# Patient Record
Sex: FEMALE | Race: BLACK OR AFRICAN AMERICAN | NOT HISPANIC OR LATINO | Employment: OTHER | URBAN - METROPOLITAN AREA
[De-identification: names, ages, dates, MRNs, and addresses within clinical notes are randomized per-mention and may not be internally consistent; named-entity substitution may affect disease eponyms.]

---

## 2023-07-11 LAB — TROPONIN I, HIGH SENSITIVITY: 7 NG/L (ref 0–13)

## 2024-01-14 ENCOUNTER — APPOINTMENT (OUTPATIENT)
Dept: RADIOLOGY | Facility: HOSPITAL | Age: 64
End: 2024-01-14
Payer: MEDICAID

## 2024-01-14 ENCOUNTER — HOSPITAL ENCOUNTER (INPATIENT)
Facility: HOSPITAL | Age: 64
LOS: 9 days | Discharge: NURSING, REHAB OR LONG-TERM CARE | End: 2024-01-24
Attending: STUDENT IN AN ORGANIZED HEALTH CARE EDUCATION/TRAINING PROGRAM | Admitting: STUDENT IN AN ORGANIZED HEALTH CARE EDUCATION/TRAINING PROGRAM
Payer: MEDICAID

## 2024-01-14 DIAGNOSIS — J11.1 INFLUENZA: ICD-10-CM

## 2024-01-14 DIAGNOSIS — I21.4 NSTEMI (NON-ST ELEVATED MYOCARDIAL INFARCTION) (MULTI): ICD-10-CM

## 2024-01-14 DIAGNOSIS — R06.02 SHORTNESS OF BREATH: Primary | ICD-10-CM

## 2024-01-14 DIAGNOSIS — J96.02 ACUTE RESPIRATORY FAILURE WITH HYPERCAPNIA (MULTI): ICD-10-CM

## 2024-01-14 DIAGNOSIS — R07.9 CHEST PAIN, UNSPECIFIED: ICD-10-CM

## 2024-01-14 DIAGNOSIS — R07.89 OTHER CHEST PAIN: ICD-10-CM

## 2024-01-14 PROCEDURE — 71045 X-RAY EXAM CHEST 1 VIEW: CPT

## 2024-01-14 PROCEDURE — 2500000002 HC RX 250 W HCPCS SELF ADMINISTERED DRUGS (ALT 637 FOR MEDICARE OP, ALT 636 FOR OP/ED): Performed by: STUDENT IN AN ORGANIZED HEALTH CARE EDUCATION/TRAINING PROGRAM

## 2024-01-14 PROCEDURE — 99291 CRITICAL CARE FIRST HOUR: CPT | Performed by: STUDENT IN AN ORGANIZED HEALTH CARE EDUCATION/TRAINING PROGRAM

## 2024-01-14 PROCEDURE — 94640 AIRWAY INHALATION TREATMENT: CPT

## 2024-01-14 PROCEDURE — 5A09357 ASSISTANCE WITH RESPIRATORY VENTILATION, LESS THAN 24 CONSECUTIVE HOURS, CONTINUOUS POSITIVE AIRWAY PRESSURE: ICD-10-PCS | Performed by: STUDENT IN AN ORGANIZED HEALTH CARE EDUCATION/TRAINING PROGRAM

## 2024-01-14 PROCEDURE — 94660 CPAP INITIATION&MGMT: CPT

## 2024-01-14 RX ORDER — IPRATROPIUM BROMIDE AND ALBUTEROL SULFATE 2.5; .5 MG/3ML; MG/3ML
SOLUTION RESPIRATORY (INHALATION)
Status: DISPENSED
Start: 2024-01-14 | End: 2024-01-15

## 2024-01-14 RX ORDER — IPRATROPIUM BROMIDE AND ALBUTEROL SULFATE 2.5; .5 MG/3ML; MG/3ML
6 SOLUTION RESPIRATORY (INHALATION) ONCE
Status: COMPLETED | OUTPATIENT
Start: 2024-01-14 | End: 2024-01-14

## 2024-01-14 RX ADMIN — IPRATROPIUM BROMIDE AND ALBUTEROL SULFATE 6 ML: .5; 3 SOLUTION RESPIRATORY (INHALATION) at 23:45

## 2024-01-14 ASSESSMENT — COLUMBIA-SUICIDE SEVERITY RATING SCALE - C-SSRS
1. IN THE PAST MONTH, HAVE YOU WISHED YOU WERE DEAD OR WISHED YOU COULD GO TO SLEEP AND NOT WAKE UP?: NO
6. HAVE YOU EVER DONE ANYTHING, STARTED TO DO ANYTHING, OR PREPARED TO DO ANYTHING TO END YOUR LIFE?: NO
2. HAVE YOU ACTUALLY HAD ANY THOUGHTS OF KILLING YOURSELF?: NO

## 2024-01-15 ENCOUNTER — APPOINTMENT (OUTPATIENT)
Dept: CARDIOLOGY | Facility: HOSPITAL | Age: 64
End: 2024-01-15
Payer: MEDICAID

## 2024-01-15 PROBLEM — R06.02 SHORTNESS OF BREATH: Status: ACTIVE | Noted: 2024-01-15

## 2024-01-15 LAB
ALBUMIN SERPL BCP-MCNC: 3.8 G/DL (ref 3.4–5)
ALP SERPL-CCNC: 59 U/L (ref 33–136)
ALT SERPL W P-5'-P-CCNC: 14 U/L (ref 7–45)
ANION GAP BLDV CALCULATED.4IONS-SCNC: 9 MMOL/L (ref 10–25)
ANION GAP SERPL CALC-SCNC: 14 MMOL/L (ref 10–20)
APTT PPP: 34 SECONDS (ref 27–38)
AST SERPL W P-5'-P-CCNC: 17 U/L (ref 9–39)
ATRIAL RATE: 118 BPM
BASE EXCESS BLDV CALC-SCNC: 6.6 MMOL/L (ref -2–3)
BASE EXCESS BLDV CALC-SCNC: 6.9 MMOL/L (ref -2–3)
BASOPHILS # BLD AUTO: 0.01 X10*3/UL (ref 0–0.1)
BASOPHILS NFR BLD AUTO: 0.1 %
BILIRUB SERPL-MCNC: 0.4 MG/DL (ref 0–1.2)
BNP SERPL-MCNC: 85 PG/ML (ref 0–99)
BODY TEMPERATURE: 37 DEGREES CELSIUS
BODY TEMPERATURE: 37 DEGREES CELSIUS
BUN SERPL-MCNC: 8 MG/DL (ref 6–23)
CA-I BLDV-SCNC: 1.15 MMOL/L (ref 1.1–1.33)
CALCIUM SERPL-MCNC: 8.9 MG/DL (ref 8.6–10.3)
CARDIAC TROPONIN I PNL SERPL HS: 189 NG/L (ref 0–13)
CARDIAC TROPONIN I PNL SERPL HS: 49 NG/L (ref 0–13)
CARDIAC TROPONIN I PNL SERPL HS: 648 NG/L (ref 0–13)
CHLORIDE BLDV-SCNC: 98 MMOL/L (ref 98–107)
CHLORIDE SERPL-SCNC: 96 MMOL/L (ref 98–107)
CO2 SERPL-SCNC: 32 MMOL/L (ref 21–32)
CREAT SERPL-MCNC: 0.83 MG/DL (ref 0.5–1.05)
CRITICAL CALL TIME: 9
CRITICAL CALLED BY: ABNORMAL
CRITICAL CALLED TO: ABNORMAL
CRITICAL READ BACK: ABNORMAL
EGFRCR SERPLBLD CKD-EPI 2021: 79 ML/MIN/1.73M*2
EOSINOPHIL # BLD AUTO: 0.02 X10*3/UL (ref 0–0.7)
EOSINOPHIL NFR BLD AUTO: 0.2 %
ERYTHROCYTE [DISTWIDTH] IN BLOOD BY AUTOMATED COUNT: 15.7 % (ref 11.5–14.5)
FLOW: 6 LPM
FLUAV RNA RESP QL NAA+PROBE: DETECTED
FLUBV RNA RESP QL NAA+PROBE: NOT DETECTED
GLUCOSE BLD MANUAL STRIP-MCNC: 232 MG/DL (ref 74–99)
GLUCOSE BLD MANUAL STRIP-MCNC: 234 MG/DL (ref 74–99)
GLUCOSE BLD MANUAL STRIP-MCNC: 234 MG/DL (ref 74–99)
GLUCOSE BLDV-MCNC: 233 MG/DL (ref 74–99)
GLUCOSE SERPL-MCNC: 205 MG/DL (ref 74–99)
HCO3 BLDV-SCNC: 34.8 MMOL/L (ref 22–26)
HCO3 BLDV-SCNC: 35.6 MMOL/L (ref 22–26)
HCT VFR BLD AUTO: 34.9 % (ref 36–46)
HCT VFR BLD EST: 34 % (ref 36–46)
HGB BLD-MCNC: 10.7 G/DL (ref 12–16)
HGB BLDV-MCNC: 11.3 G/DL (ref 12–16)
HOLD SPECIMEN: NORMAL
HOLD SPECIMEN: NORMAL
IMM GRANULOCYTES # BLD AUTO: 0.03 X10*3/UL (ref 0–0.7)
IMM GRANULOCYTES NFR BLD AUTO: 0.4 % (ref 0–0.9)
INHALED O2 CONCENTRATION: 40 %
INHALED O2 CONCENTRATION: 40 %
INR PPP: 1.1 (ref 0.9–1.1)
LACTATE BLDV-SCNC: 1.6 MMOL/L (ref 0.4–2)
LYMPHOCYTES # BLD AUTO: 0.46 X10*3/UL (ref 1.2–4.8)
LYMPHOCYTES NFR BLD AUTO: 5.6 %
MAGNESIUM SERPL-MCNC: 1.95 MG/DL (ref 1.6–2.4)
MCH RBC QN AUTO: 26.5 PG (ref 26–34)
MCHC RBC AUTO-ENTMCNC: 30.7 G/DL (ref 32–36)
MCV RBC AUTO: 86 FL (ref 80–100)
MONOCYTES # BLD AUTO: 0.43 X10*3/UL (ref 0.1–1)
MONOCYTES NFR BLD AUTO: 5.2 %
NEUTROPHILS # BLD AUTO: 7.33 X10*3/UL (ref 1.2–7.7)
NEUTROPHILS NFR BLD AUTO: 88.5 %
NRBC BLD-RTO: 0 /100 WBCS (ref 0–0)
OXYHGB MFR BLDV: 26.7 % (ref 45–75)
OXYHGB MFR BLDV: 57.7 % (ref 45–75)
P AXIS: 55 DEGREES
P OFFSET: 188 MS
P ONSET: 129 MS
PCO2 BLDV: 66 MM HG (ref 41–51)
PCO2 BLDV: 74 MM HG (ref 41–51)
PH BLDV: 7.29 PH (ref 7.33–7.43)
PH BLDV: 7.33 PH (ref 7.33–7.43)
PLATELET # BLD AUTO: 192 X10*3/UL (ref 150–450)
PO2 BLDV: 25 MM HG (ref 35–45)
PO2 BLDV: 37 MM HG (ref 35–45)
POTASSIUM BLDV-SCNC: 4.3 MMOL/L (ref 3.5–5.3)
POTASSIUM SERPL-SCNC: 4.1 MMOL/L (ref 3.5–5.3)
PR INTERVAL: 162 MS
PROT SERPL-MCNC: 8.3 G/DL (ref 6.4–8.2)
PROTHROMBIN TIME: 12.7 SECONDS (ref 9.8–12.8)
Q ONSET: 210 MS
QRS COUNT: 20 BEATS
QRS DURATION: 92 MS
QT INTERVAL: 324 MS
QTC CALCULATION(BAZETT): 454 MS
QTC FREDERICIA: 406 MS
R AXIS: -30 DEGREES
RBC # BLD AUTO: 4.04 X10*6/UL (ref 4–5.2)
RSV RNA RESP QL NAA+PROBE: NOT DETECTED
SAO2 % BLDV: 27 % (ref 45–75)
SAO2 % BLDV: 59 % (ref 45–75)
SARS-COV-2 RNA RESP QL NAA+PROBE: NOT DETECTED
SODIUM BLDV-SCNC: 138 MMOL/L (ref 136–145)
SODIUM SERPL-SCNC: 138 MMOL/L (ref 136–145)
T AXIS: 71 DEGREES
T OFFSET: 372 MS
TEST COMMENT: ABNORMAL
UFH PPP CHRO-ACNC: 0.3 IU/ML
UFH PPP CHRO-ACNC: 0.4 IU/ML
UFH PPP CHRO-ACNC: <0.1 IU/ML
VENTRICULAR RATE: 118 BPM
WBC # BLD AUTO: 8.3 X10*3/UL (ref 4.4–11.3)

## 2024-01-15 PROCEDURE — 71045 X-RAY EXAM CHEST 1 VIEW: CPT | Performed by: STUDENT IN AN ORGANIZED HEALTH CARE EDUCATION/TRAINING PROGRAM

## 2024-01-15 PROCEDURE — 83880 ASSAY OF NATRIURETIC PEPTIDE: CPT | Performed by: STUDENT IN AN ORGANIZED HEALTH CARE EDUCATION/TRAINING PROGRAM

## 2024-01-15 PROCEDURE — 2500000001 HC RX 250 WO HCPCS SELF ADMINISTERED DRUGS (ALT 637 FOR MEDICARE OP)

## 2024-01-15 PROCEDURE — 96365 THER/PROPH/DIAG IV INF INIT: CPT

## 2024-01-15 PROCEDURE — 36415 COLL VENOUS BLD VENIPUNCTURE: CPT | Performed by: STUDENT IN AN ORGANIZED HEALTH CARE EDUCATION/TRAINING PROGRAM

## 2024-01-15 PROCEDURE — 80053 COMPREHEN METABOLIC PANEL: CPT | Performed by: STUDENT IN AN ORGANIZED HEALTH CARE EDUCATION/TRAINING PROGRAM

## 2024-01-15 PROCEDURE — 2500000004 HC RX 250 GENERAL PHARMACY W/ HCPCS (ALT 636 FOR OP/ED)

## 2024-01-15 PROCEDURE — 2500000002 HC RX 250 W HCPCS SELF ADMINISTERED DRUGS (ALT 637 FOR MEDICARE OP, ALT 636 FOR OP/ED)

## 2024-01-15 PROCEDURE — 2500000004 HC RX 250 GENERAL PHARMACY W/ HCPCS (ALT 636 FOR OP/ED): Performed by: STUDENT IN AN ORGANIZED HEALTH CARE EDUCATION/TRAINING PROGRAM

## 2024-01-15 PROCEDURE — 83735 ASSAY OF MAGNESIUM: CPT

## 2024-01-15 PROCEDURE — 84484 ASSAY OF TROPONIN QUANT: CPT | Performed by: STUDENT IN AN ORGANIZED HEALTH CARE EDUCATION/TRAINING PROGRAM

## 2024-01-15 PROCEDURE — 85025 COMPLETE CBC W/AUTO DIFF WBC: CPT | Performed by: STUDENT IN AN ORGANIZED HEALTH CARE EDUCATION/TRAINING PROGRAM

## 2024-01-15 PROCEDURE — 94660 CPAP INITIATION&MGMT: CPT

## 2024-01-15 PROCEDURE — 85520 HEPARIN ASSAY: CPT | Performed by: STUDENT IN AN ORGANIZED HEALTH CARE EDUCATION/TRAINING PROGRAM

## 2024-01-15 PROCEDURE — 84132 ASSAY OF SERUM POTASSIUM: CPT | Performed by: STUDENT IN AN ORGANIZED HEALTH CARE EDUCATION/TRAINING PROGRAM

## 2024-01-15 PROCEDURE — 99223 1ST HOSP IP/OBS HIGH 75: CPT | Performed by: STUDENT IN AN ORGANIZED HEALTH CARE EDUCATION/TRAINING PROGRAM

## 2024-01-15 PROCEDURE — 82947 ASSAY GLUCOSE BLOOD QUANT: CPT

## 2024-01-15 PROCEDURE — 99222 1ST HOSP IP/OBS MODERATE 55: CPT

## 2024-01-15 PROCEDURE — 82805 BLOOD GASES W/O2 SATURATION: CPT | Performed by: STUDENT IN AN ORGANIZED HEALTH CARE EDUCATION/TRAINING PROGRAM

## 2024-01-15 PROCEDURE — 87637 SARSCOV2&INF A&B&RSV AMP PRB: CPT | Performed by: STUDENT IN AN ORGANIZED HEALTH CARE EDUCATION/TRAINING PROGRAM

## 2024-01-15 PROCEDURE — 96375 TX/PRO/DX INJ NEW DRUG ADDON: CPT

## 2024-01-15 PROCEDURE — 2060000001 HC INTERMEDIATE ICU ROOM DAILY

## 2024-01-15 PROCEDURE — 85610 PROTHROMBIN TIME: CPT

## 2024-01-15 PROCEDURE — 84484 ASSAY OF TROPONIN QUANT: CPT

## 2024-01-15 PROCEDURE — 93005 ELECTROCARDIOGRAM TRACING: CPT

## 2024-01-15 RX ORDER — DEXTROSE MONOHYDRATE 100 MG/ML
0.3 INJECTION, SOLUTION INTRAVENOUS ONCE AS NEEDED
Status: DISCONTINUED | OUTPATIENT
Start: 2024-01-15 | End: 2024-01-24 | Stop reason: HOSPADM

## 2024-01-15 RX ORDER — MONTELUKAST SODIUM 10 MG/1
10 TABLET ORAL NIGHTLY
COMMUNITY
Start: 2022-07-22

## 2024-01-15 RX ORDER — GUAIFENESIN 600 MG/1
600 TABLET, EXTENDED RELEASE ORAL 2 TIMES DAILY
Status: DISCONTINUED | OUTPATIENT
Start: 2024-01-15 | End: 2024-01-24 | Stop reason: HOSPADM

## 2024-01-15 RX ORDER — CYCLOBENZAPRINE HCL 10 MG
1 TABLET ORAL EVERY 12 HOURS
COMMUNITY
Start: 2022-07-22

## 2024-01-15 RX ORDER — ERGOCALCIFEROL 1.25 MG/1
50000 CAPSULE ORAL 2 TIMES WEEKLY
COMMUNITY
Start: 2023-03-16

## 2024-01-15 RX ORDER — DOCUSATE SODIUM 100 MG/1
100 CAPSULE, LIQUID FILLED ORAL 2 TIMES DAILY
COMMUNITY

## 2024-01-15 RX ORDER — SPIRONOLACTONE 25 MG/1
12.5 TABLET ORAL DAILY
COMMUNITY
Start: 2023-08-11

## 2024-01-15 RX ORDER — POLYETHYLENE GLYCOL 3350 17 G/17G
17 POWDER, FOR SOLUTION ORAL DAILY
Status: DISCONTINUED | OUTPATIENT
Start: 2024-01-15 | End: 2024-01-24 | Stop reason: HOSPADM

## 2024-01-15 RX ORDER — BENZONATATE 100 MG/1
100 CAPSULE ORAL 3 TIMES DAILY PRN
Status: DISCONTINUED | OUTPATIENT
Start: 2024-01-15 | End: 2024-01-24 | Stop reason: HOSPADM

## 2024-01-15 RX ORDER — ALBUTEROL SULFATE 0.83 MG/ML
2.5 SOLUTION RESPIRATORY (INHALATION) EVERY 6 HOURS PRN
COMMUNITY
Start: 2023-09-01

## 2024-01-15 RX ORDER — DEXTROSE 50 % IN WATER (D50W) INTRAVENOUS SYRINGE
25
Status: DISCONTINUED | OUTPATIENT
Start: 2024-01-15 | End: 2024-01-24 | Stop reason: HOSPADM

## 2024-01-15 RX ORDER — ATORVASTATIN CALCIUM 40 MG/1
40 TABLET, FILM COATED ORAL NIGHTLY
COMMUNITY
Start: 2021-10-25

## 2024-01-15 RX ORDER — BUDESONIDE 0.5 MG/2ML
0.5 INHALANT ORAL EVERY 12 HOURS
COMMUNITY
Start: 2023-08-28

## 2024-01-15 RX ORDER — INSULIN LISPRO 100 [IU]/ML
10 INJECTION, SOLUTION INTRAVENOUS; SUBCUTANEOUS
COMMUNITY
Start: 2022-10-20

## 2024-01-15 RX ORDER — ACETAMINOPHEN 325 MG/1
650 TABLET ORAL EVERY 4 HOURS PRN
Status: DISCONTINUED | OUTPATIENT
Start: 2024-01-15 | End: 2024-01-24 | Stop reason: HOSPADM

## 2024-01-15 RX ORDER — SENNOSIDES 8.6 MG/1
1 TABLET ORAL 2 TIMES DAILY
COMMUNITY
Start: 2012-04-11

## 2024-01-15 RX ORDER — PSEUDOEPH/DM/GUAIFEN/ACETAMIN 30-10-324
1 EXPECTORANT ORAL AS NEEDED
COMMUNITY
Start: 2024-01-05

## 2024-01-15 RX ORDER — INSULIN LISPRO 100 [IU]/ML
INJECTION, SOLUTION INTRAVENOUS; SUBCUTANEOUS
COMMUNITY
Start: 2023-11-13

## 2024-01-15 RX ORDER — LORAZEPAM 1 MG/1
1 TABLET ORAL EVERY 8 HOURS PRN
COMMUNITY

## 2024-01-15 RX ORDER — NITROGLYCERIN 0.4 MG/1
0.4 TABLET SUBLINGUAL EVERY 5 MIN PRN
COMMUNITY

## 2024-01-15 RX ORDER — HEPARIN SODIUM 10000 [USP'U]/100ML
0-4000 INJECTION, SOLUTION INTRAVENOUS CONTINUOUS
Status: DISCONTINUED | OUTPATIENT
Start: 2024-01-15 | End: 2024-01-17

## 2024-01-15 RX ORDER — ACETAMINOPHEN 500 MG
2 TABLET ORAL EVERY 8 HOURS PRN
COMMUNITY
Start: 2023-08-23

## 2024-01-15 RX ORDER — CHLORHEXIDINE GLUCONATE ORAL RINSE 1.2 MG/ML
15 SOLUTION DENTAL DAILY
COMMUNITY

## 2024-01-15 RX ORDER — FORMOTEROL FUMARATE DIHYDRATE 20 UG/2ML
20 SOLUTION RESPIRATORY (INHALATION) EVERY 12 HOURS
COMMUNITY
Start: 2023-08-28

## 2024-01-15 RX ORDER — CALCIUM CARBONATE 200(500)MG
1 TABLET,CHEWABLE ORAL EVERY 12 HOURS PRN
COMMUNITY
Start: 2022-10-20

## 2024-01-15 RX ORDER — PREGABALIN 200 MG/1
200 CAPSULE ORAL 2 TIMES DAILY
COMMUNITY

## 2024-01-15 RX ORDER — POLYETHYLENE GLYCOL 3350 17 G/17G
17 POWDER, FOR SOLUTION ORAL DAILY
COMMUNITY
Start: 2012-07-16

## 2024-01-15 RX ORDER — HEPARIN SODIUM 5000 [USP'U]/ML
3000-4000 INJECTION, SOLUTION INTRAVENOUS; SUBCUTANEOUS EVERY 4 HOURS PRN
Status: DISCONTINUED | OUTPATIENT
Start: 2024-01-15 | End: 2024-01-17

## 2024-01-15 RX ORDER — HYDROCORTISONE 1 %
1 CREAM (GRAM) TOPICAL EVERY 12 HOURS PRN
COMMUNITY
Start: 2023-12-04

## 2024-01-15 RX ORDER — BUTALBITAL, ASPIRIN, AND CAFFEINE 325; 50; 40 MG/1; MG/1; MG/1
1 CAPSULE ORAL EVERY 6 HOURS PRN
COMMUNITY
Start: 2023-04-01

## 2024-01-15 RX ORDER — NAPROXEN SODIUM 220 MG/1
1 TABLET, FILM COATED ORAL DAILY
COMMUNITY
Start: 2023-08-22

## 2024-01-15 RX ORDER — HEPARIN SODIUM 5000 [USP'U]/ML
4000 INJECTION, SOLUTION INTRAVENOUS; SUBCUTANEOUS ONCE
Status: COMPLETED | OUTPATIENT
Start: 2024-01-15 | End: 2024-01-15

## 2024-01-15 RX ORDER — FUROSEMIDE 40 MG/1
40 TABLET ORAL DAILY
COMMUNITY

## 2024-01-15 RX ORDER — MIRABEGRON 25 MG/1
1 TABLET, FILM COATED, EXTENDED RELEASE ORAL DAILY
COMMUNITY
Start: 2023-12-22

## 2024-01-15 RX ORDER — OMEPRAZOLE 20 MG/1
20 CAPSULE, DELAYED RELEASE ORAL
COMMUNITY
Start: 2023-02-16

## 2024-01-15 RX ORDER — OXYCODONE AND ACETAMINOPHEN 5; 325 MG/1; MG/1
2 TABLET ORAL EVERY 6 HOURS
COMMUNITY
Start: 2014-10-13 | End: 2024-01-24 | Stop reason: HOSPADM

## 2024-01-15 RX ORDER — NAPROXEN SODIUM 220 MG/1
324 TABLET, FILM COATED ORAL ONCE
Status: COMPLETED | OUTPATIENT
Start: 2024-01-15 | End: 2024-01-15

## 2024-01-15 RX ORDER — BISACODYL 10 MG/1
10 SUPPOSITORY RECTAL DAILY PRN
COMMUNITY
Start: 2023-08-10

## 2024-01-15 RX ORDER — AMMONIUM LACTATE 12 G/100G
1 LOTION TOPICAL AS NEEDED
COMMUNITY
Start: 2023-08-23

## 2024-01-15 RX ORDER — INSULIN GLARGINE 100 [IU]/ML
26 INJECTION, SOLUTION SUBCUTANEOUS 2 TIMES DAILY
Status: ON HOLD | COMMUNITY
Start: 2020-12-03 | End: 2024-01-19 | Stop reason: SDUPTHER

## 2024-01-15 RX ORDER — DULOXETIN HYDROCHLORIDE 60 MG/1
60 CAPSULE, DELAYED RELEASE ORAL DAILY
COMMUNITY
Start: 2022-05-03

## 2024-01-15 RX ORDER — CAPSAICIN 0.75 MG/G
1 CREAM TOPICAL 3 TIMES DAILY
COMMUNITY
Start: 2023-12-13

## 2024-01-15 RX ORDER — FAMOTIDINE 20 MG/1
20 TABLET, FILM COATED ORAL DAILY
COMMUNITY
Start: 2012-07-16

## 2024-01-15 RX ORDER — MAGNESIUM SULFATE HEPTAHYDRATE 40 MG/ML
2 INJECTION, SOLUTION INTRAVENOUS ONCE
Status: COMPLETED | OUTPATIENT
Start: 2024-01-15 | End: 2024-01-15

## 2024-01-15 RX ORDER — INSULIN LISPRO 100 [IU]/ML
0-10 INJECTION, SOLUTION INTRAVENOUS; SUBCUTANEOUS
Status: DISCONTINUED | OUTPATIENT
Start: 2024-01-15 | End: 2024-01-24 | Stop reason: HOSPADM

## 2024-01-15 RX ADMIN — INSULIN LISPRO 4 UNITS: 100 INJECTION, SOLUTION INTRAVENOUS; SUBCUTANEOUS at 13:29

## 2024-01-15 RX ADMIN — HEPARIN SODIUM 1000 UNITS/HR: 10000 INJECTION, SOLUTION INTRAVENOUS at 04:17

## 2024-01-15 RX ADMIN — METHYLPREDNISOLONE SODIUM SUCCINATE 40 MG: 40 INJECTION, POWDER, FOR SOLUTION INTRAMUSCULAR; INTRAVENOUS at 18:46

## 2024-01-15 RX ADMIN — MAGNESIUM SULFATE HEPTAHYDRATE 2 G: 40 INJECTION, SOLUTION INTRAVENOUS at 00:39

## 2024-01-15 RX ADMIN — INSULIN LISPRO 4 UNITS: 100 INJECTION, SOLUTION INTRAVENOUS; SUBCUTANEOUS at 09:11

## 2024-01-15 RX ADMIN — METHYLPREDNISOLONE SODIUM SUCCINATE 40 MG: 40 INJECTION, POWDER, FOR SOLUTION INTRAMUSCULAR; INTRAVENOUS at 08:56

## 2024-01-15 RX ADMIN — INSULIN LISPRO 4 UNITS: 100 INJECTION, SOLUTION INTRAVENOUS; SUBCUTANEOUS at 18:47

## 2024-01-15 RX ADMIN — METHYLPREDNISOLONE SODIUM SUCCINATE 125 MG: 125 INJECTION INTRAMUSCULAR; INTRAVENOUS at 00:38

## 2024-01-15 RX ADMIN — HEPARIN SODIUM 4000 UNITS: 5000 INJECTION INTRAVENOUS; SUBCUTANEOUS at 04:16

## 2024-01-15 RX ADMIN — ASPIRIN 324 MG: 81 TABLET, CHEWABLE ORAL at 04:16

## 2024-01-15 SDOH — SOCIAL STABILITY: SOCIAL INSECURITY: HAS ANYONE EVER THREATENED TO HURT YOUR FAMILY OR YOUR PETS?: NO

## 2024-01-15 SDOH — SOCIAL STABILITY: SOCIAL INSECURITY: DO YOU FEEL ANYONE HAS EXPLOITED OR TAKEN ADVANTAGE OF YOU FINANCIALLY OR OF YOUR PERSONAL PROPERTY?: NO

## 2024-01-15 SDOH — SOCIAL STABILITY: SOCIAL INSECURITY: DO YOU FEEL UNSAFE GOING BACK TO THE PLACE WHERE YOU ARE LIVING?: NO

## 2024-01-15 SDOH — SOCIAL STABILITY: SOCIAL INSECURITY: ARE YOU OR HAVE YOU BEEN THREATENED OR ABUSED PHYSICALLY, EMOTIONALLY, OR SEXUALLY BY ANYONE?: NO

## 2024-01-15 SDOH — SOCIAL STABILITY: SOCIAL INSECURITY: ARE THERE ANY APPARENT SIGNS OF INJURIES/BEHAVIORS THAT COULD BE RELATED TO ABUSE/NEGLECT?: NO

## 2024-01-15 SDOH — SOCIAL STABILITY: SOCIAL INSECURITY: WERE YOU ABLE TO COMPLETE ALL THE BEHAVIORAL HEALTH SCREENINGS?: YES

## 2024-01-15 SDOH — SOCIAL STABILITY: SOCIAL INSECURITY: ABUSE: ADULT

## 2024-01-15 SDOH — SOCIAL STABILITY: SOCIAL INSECURITY: DOES ANYONE TRY TO KEEP YOU FROM HAVING/CONTACTING OTHER FRIENDS OR DOING THINGS OUTSIDE YOUR HOME?: NO

## 2024-01-15 SDOH — SOCIAL STABILITY: SOCIAL INSECURITY: HAVE YOU HAD THOUGHTS OF HARMING ANYONE ELSE?: NO

## 2024-01-15 ASSESSMENT — LIFESTYLE VARIABLES
AUDIT-C TOTAL SCORE: 0
SKIP TO QUESTIONS 9-10: 1
SUBSTANCE_ABUSE_PAST_12_MONTHS: NO
HOW MANY STANDARD DRINKS CONTAINING ALCOHOL DO YOU HAVE ON A TYPICAL DAY: PATIENT DOES NOT DRINK
HOW OFTEN DO YOU HAVE A DRINK CONTAINING ALCOHOL: NEVER
HOW OFTEN DO YOU HAVE 6 OR MORE DRINKS ON ONE OCCASION: NEVER
AUDIT-C TOTAL SCORE: 0
PRESCIPTION_ABUSE_PAST_12_MONTHS: NO

## 2024-01-15 ASSESSMENT — COGNITIVE AND FUNCTIONAL STATUS - GENERAL
MOBILITY SCORE: 10
TURNING FROM BACK TO SIDE WHILE IN FLAT BAD: A LOT
TOILETING: A LITTLE
DRESSING REGULAR UPPER BODY CLOTHING: A LITTLE
PATIENT BASELINE BEDBOUND: NO
MOVING TO AND FROM BED TO CHAIR: A LOT
STANDING UP FROM CHAIR USING ARMS: A LOT
DRESSING REGULAR LOWER BODY CLOTHING: A LOT
CLIMB 3 TO 5 STEPS WITH RAILING: TOTAL
WALKING IN HOSPITAL ROOM: TOTAL
HELP NEEDED FOR BATHING: A LITTLE
DAILY ACTIVITIY SCORE: 19
MOVING FROM LYING ON BACK TO SITTING ON SIDE OF FLAT BED WITH BEDRAILS: A LOT

## 2024-01-15 ASSESSMENT — ACTIVITIES OF DAILY LIVING (ADL)
JUDGMENT_ADEQUATE_SAFELY_COMPLETE_DAILY_ACTIVITIES: YES
BATHING: NEEDS ASSISTANCE
PATIENT'S MEMORY ADEQUATE TO SAFELY COMPLETE DAILY ACTIVITIES?: YES
TOILETING: NEEDS ASSISTANCE
ADEQUATE_TO_COMPLETE_ADL: YES
ASSISTIVE_DEVICE: WHEELCHAIR
FEEDING YOURSELF: INDEPENDENT
GROOMING: INDEPENDENT
DRESSING YOURSELF: NEEDS ASSISTANCE
LACK_OF_TRANSPORTATION: NO
HEARING - RIGHT EAR: FUNCTIONAL
WALKS IN HOME: DEPENDENT
HEARING - LEFT EAR: FUNCTIONAL

## 2024-01-15 ASSESSMENT — PATIENT HEALTH QUESTIONNAIRE - PHQ9
2. FEELING DOWN, DEPRESSED OR HOPELESS: NOT AT ALL
SUM OF ALL RESPONSES TO PHQ9 QUESTIONS 1 & 2: 0
1. LITTLE INTEREST OR PLEASURE IN DOING THINGS: NOT AT ALL

## 2024-01-15 NOTE — H&P
History Of Present Illness  Jacinta Castrejon is a 64 y.o. female with a past medical history of COPD chronically on 4 L oxygen, DIONICIO with CPAP, diabetes, hypertension, hyperlipidemia, heart failure, morbid obesity, who presented to [facility] ED today via EMS from nursing facility with shortness of breath.  Per ED physician, “Facility was reporting that patient was 30% on room air although we do believe this is likely to be inaccurate as she is still mentating appropriately.  EMS reported that she was in the 70s when they arrived.  They placed her on nonrebreather and she did improve into the mid to low 90s.”   She reports that she has been short of breath for a few days now. States she has right chest pain that is worse with deep breaths, sore throat, ear pain, body aches. States she has a productive cough with brown sputum, denies hemoptysis.  She is uncertain of any fevers.  She supposedly received 1 nebulized treatment at facility as well as 1 in route by EMS for which they reported.     ED Course: Afebrile, /83, , RR 32, 95% on BiPAP. EKG unavailable for my review. ECG revealed sinus tachycardia at a rate of 118 beats per minute with NE interval 166 , QRS of 94 , QTc of 445.  No acute injury pattern.  Previous EKG on August 11, 2023 revealed no significant changes, per ED physician. Labs: H&H 10.7/34.9. Glucose 205. Chloride 96. BNP 85. Troponin 49, 189. Influenza A+. Covid-19 and RSV negative. Blood gas: pH 7.29, repeat 7.33, pCO2 74, repeat 66, pO2 25, repeat 37, sO2 27, repeat 59. See imaging results below. Duoneb, magnesium, and solumedrol given in ED. Patient will be admitted and continue to follow under the care of Dr. Mahajan. I was asked to do H&P and place initial admission orders.     Past Medical History  No past medical history on file.    Surgical History  No past surgical history on file.     Social History  States she smokes cigarettes socially. Occasional alcohol use. Frequent marijuana use.  "Wheelchair bound. From [facility].    Family History  No family history on file.     Allergies  Patient has no allergy information on record.    Review of Systems   10 Point review of systems was performed and is negative except for what is stated in the HPI above.  Physical Exam  Constitutional:       Comments: Morbidly obese   HENT:      Head: Normocephalic and atraumatic.      Nose: Nose normal.      Mouth/Throat:      Mouth: Mucous membranes are moist.      Pharynx: Oropharynx is clear.   Eyes:      Extraocular Movements: Extraocular movements intact.      Conjunctiva/sclera: Conjunctivae normal.      Pupils: Pupils are equal, round, and reactive to light.   Cardiovascular:      Rate and Rhythm: Regular rhythm. Tachycardia present.      Pulses: Normal pulses.      Heart sounds: Normal heart sounds.   Pulmonary:      Effort: Pulmonary effort is normal.      Comments: Conversational dyspnea noted. Tachypneic intermittently. On 5L oxygen NC (refusing BiPAP). Wheezes noted. Diminished bases.  Abdominal:      General: Abdomen is flat. Bowel sounds are normal.      Palpations: Abdomen is soft.   Musculoskeletal:         General: Normal range of motion.      Cervical back: Normal range of motion and neck supple.      Comments: BLE edema noted.   Skin:     General: Skin is warm and dry.      Capillary Refill: Capillary refill takes 2 to 3 seconds.   Neurological:      General: No focal deficit present.      Mental Status: She is alert and oriented to person, place, and time.   Psychiatric:         Mood and Affect: Mood normal.         Behavior: Behavior normal.         Thought Content: Thought content normal.         Judgment: Judgment normal.          Last Recorded Vitals  Blood pressure 105/64, pulse (!) 114, temperature 36.8 °C (98.2 °F), resp. rate 24, height 1.676 m (5' 6\"), weight (!) 177 kg (390 lb), SpO2 97 %.    Relevant Results  Results for orders placed or performed during the hospital " encounter of 01/14/24 (from the past 24 hour(s))   RSV PCR   Result Value Ref Range    RSV PCR Not Detected Not Detected   Sars-CoV-2 and Influenza A/B PCR   Result Value Ref Range    Flu A Result Detected (A) Not Detected    Flu B Result Not Detected Not Detected    Coronavirus 2019, PCR Not Detected Not Detected   BLOOD GAS VENOUS FULL PANEL   Result Value Ref Range    POCT pH, Venous 7.29 (L) 7.33 - 7.43 pH    POCT pCO2, Venous 74 (HH) 41 - 51 mm Hg    POCT pO2, Venous 25 (L) 35 - 45 mm Hg    POCT SO2, Venous 27 (L) 45 - 75 %    POCT Oxy Hemoglobin, Venous 26.7 (L) 45.0 - 75.0 %    POCT Hematocrit Calculated, Venous 34.0 (L) 36.0 - 46.0 %    POCT Sodium, Venous 138 136 - 145 mmol/L    POCT Potassium, Venous 4.3 3.5 - 5.3 mmol/L    POCT Chloride, Venous 98 98 - 107 mmol/L    POCT Ionized Calicum, Venous 1.15 1.10 - 1.33 mmol/L    POCT Glucose, Venous 233 (H) 74 - 99 mg/dL    POCT Lactate, Venous 1.6 0.4 - 2.0 mmol/L    POCT Base Excess, Venous 6.9 (H) -2.0 - 3.0 mmol/L    POCT HCO3 Calculated, Venous 35.6 (H) 22.0 - 26.0 mmol/L    POCT Hemoglobin, Venous 11.3 (L) 12.0 - 16.0 g/dL    POCT Anion Gap, Venous 9.0 (L) 10.0 - 25.0 mmol/L    Patient Temperature 37.0 degrees Celsius    FiO2 40 %    Flow 6.0 LPM    Critical Called By MORA BROWN RRT     Critical Called To DR JENKINS     Critical Call Time 9.0000     Critical Read Back Y    Comprehensive metabolic panel   Result Value Ref Range    Glucose 205 (H) 74 - 99 mg/dL    Sodium 138 136 - 145 mmol/L    Potassium 4.1 3.5 - 5.3 mmol/L    Chloride 96 (L) 98 - 107 mmol/L    Bicarbonate 32 21 - 32 mmol/L    Anion Gap 14 10 - 20 mmol/L    Urea Nitrogen 8 6 - 23 mg/dL    Creatinine 0.83 0.50 - 1.05 mg/dL    eGFR 79 >60 mL/min/1.73m*2    Calcium 8.9 8.6 - 10.3 mg/dL    Albumin 3.8 3.4 - 5.0 g/dL    Alkaline Phosphatase 59 33 - 136 U/L    Total Protein 8.3 (H) 6.4 - 8.2 g/dL    AST 17 9 - 39 U/L    Bilirubin, Total 0.4 0.0 - 1.2 mg/dL    ALT 14 7 - 45 U/L   Troponin I, High  Sensitivity   Result Value Ref Range    Troponin I, High Sensitivity 49 (H) 0 - 13 ng/L   B-type natriuretic peptide   Result Value Ref Range    BNP 85 0 - 99 pg/mL   SST TOP   Result Value Ref Range    Extra Tube Hold for add-ons.    BLOOD GAS VENOUS   Result Value Ref Range    POCT pH, Venous 7.33 7.33 - 7.43 pH    POCT pCO2, Venous 66 (H) 41 - 51 mm Hg    POCT pO2, Venous 37 35 - 45 mm Hg    POCT SO2, Venous 59 45 - 75 %    POCT Oxy Hemoglobin, Venous 57.7 45.0 - 75.0 %    POCT Base Excess, Venous 6.6 (H) -2.0 - 3.0 mmol/L    POCT HCO3 Calculated, Venous 34.8 (H) 22.0 - 26.0 mmol/L    Patient Temperature 37.0 degrees Celsius    FiO2 40 %    Test Comment ICU-S    Troponin I, High Sensitivity   Result Value Ref Range    Troponin I, High Sensitivity 189 (HH) 0 - 13 ng/L   CBC and Auto Differential   Result Value Ref Range    WBC 8.3 4.4 - 11.3 x10*3/uL    nRBC 0.0 0.0 - 0.0 /100 WBCs    RBC 4.04 4.00 - 5.20 x10*6/uL    Hemoglobin 10.7 (L) 12.0 - 16.0 g/dL    Hematocrit 34.9 (L) 36.0 - 46.0 %    MCV 86 80 - 100 fL    MCH 26.5 26.0 - 34.0 pg    MCHC 30.7 (L) 32.0 - 36.0 g/dL    RDW 15.7 (H) 11.5 - 14.5 %    Platelets 192 150 - 450 x10*3/uL    Neutrophils % 88.5 40.0 - 80.0 %    Immature Granulocytes %, Automated 0.4 0.0 - 0.9 %    Lymphocytes % 5.6 13.0 - 44.0 %    Monocytes % 5.2 2.0 - 10.0 %    Eosinophils % 0.2 0.0 - 6.0 %    Basophils % 0.1 0.0 - 2.0 %    Neutrophils Absolute 7.33 1.20 - 7.70 x10*3/uL    Immature Granulocytes Absolute, Automated 0.03 0.00 - 0.70 x10*3/uL    Lymphocytes Absolute 0.46 (L) 1.20 - 4.80 x10*3/uL    Monocytes Absolute 0.43 0.10 - 1.00 x10*3/uL    Eosinophils Absolute 0.02 0.00 - 0.70 x10*3/uL    Basophils Absolute 0.01 0.00 - 0.10 x10*3/uL   Coagulation Screen   Result Value Ref Range    Protime 12.7 9.8 - 12.8 seconds    INR 1.1 0.9 - 1.1    aPTT 34 27 - 38 seconds   Magnesium   Result Value Ref Range    Magnesium 1.95 1.60 - 2.40 mg/dL   Troponin I, High Sensitivity   Result Value Ref  Range    Troponin I, High Sensitivity 648 (HH) 0 - 13 ng/L   Heparin Assay, UFH   Result Value Ref Range    Heparin Unfractionated <0.1 See Comment Below for Therapeutic Ranges IU/mL     XR chest 1 view    Result Date: 1/15/2024  Interpreted By:  Edin You, STUDY: XR CHEST 1 VIEW;  1/15/2024 12:23 am   INDICATION: Signs/Symptoms:SOB.   COMPARISON: 08/11/2023   ACCESSION NUMBER(S): [id number]   ORDERING CLINICIAN: RIVAS JENKINS   FINDINGS:     Cardiomegaly. The pulmonary vasculature is congested centrally and indistinct peripherally. No focal consolidation. No pneumothorax.       Vascular congestion, with or without mild interstitial edema, evaluation limited due to large body habitus and poor inspiration.     MACRO: None.   Signed by: Edin You 1/15/2024 1:17 AM Dictation workstation:   [id number]        Assessment/Plan   Principal Problem:    Shortness of breath    64 year old female with a past medical history of COPD chronically on 4 L oxygen, DIONICIO with CPAP, diabetes, hypertension, hyperlipidemia, heart failure, morbid obesity, who presented to [facility] ED today via EMS from nursing facility with shortness of breath.  Per ED physician, “Facility was reporting that patient was 30% on room air although we do believe this is likely to be inaccurate as she is still mentating appropriately.  EMS reported that she was in the 70s when they arrived.  They placed her on nonrebreather and she did improve into the mid to low 90s.”   She reports that she has been short of breath for a few days now. Cardiology consulted. Continue heparin, steroids, nebulizers, and BiPAP as needed. Patient to be admitted to hospital for further medical management.    #COPD Exacerbation on 4L continuous oxygen at home  #Acute on chronic respiratory failure with hypoxia and hypercapnia and supplemental oxygen requirement  #Influenza A+  #Shortness of breath  #Hx of DIONICIO  #Morbid obesity  Admit to inpatient/telemetry to   [facility]  See imaging results above  Continue BiPAP as needed. Wean BiPAP as able  Titrate oxygen to maintain sats >90%  Nebulizers  Solu-medrol 40mg IVP every 8 hours  Incentive spirometer  Droplet Isolation  UA ordered  Repeat labs in AM    #Suspect NSTEMI  #Elevated troponin   Consult cardiology and appreciate recs  Echocardiogram 8/15/23: EF 60-65%  NPO until cleared by cardiology  Aspirin ordered  Troponin 49, 189. Will trend.  Initiate heparin infusion    #Acute on chronic anemia  H&H 10.7/34.9  Continue to monitor    Chronic issues  #DMII  #HTN  #HLD  #CHF  Continue home meds as appropriate when nurse completes home medication reconciliation  SSI with hypoglycemic protocol  Full code  Smoking cessation  CPAP    #DVT prophylaxis  Heparin infusion  SCD's    I spent 40 minutes in the professional and overall care of this patient.    Monica Recio, APRN-CNP

## 2024-01-15 NOTE — H&P
History Of Present Illness  Jacinta Castrejon is a 64 y.o. female with a past medical history of COPD chronically on 4 L oxygen, DIONICIO with CPAP, diabetes, hypertension, hyperlipidemia, heart failure, morbid obesity, who presented to [facility] ED today via EMS from nursing facility with shortness of breath.  Per ED physician, “Facility was reporting that patient was 30% on room air although we do believe this is likely to be inaccurate as she is still mentating appropriately.  EMS reported that she was in the 70s when they arrived.  They placed her on nonrebreather and she did improve into the mid to low 90s.”   She reports that she has been short of breath for a few days now. States she has right chest pain that is worse with deep breaths, sore throat, ear pain, body aches. States she has a productive cough with brown sputum, denies hemoptysis.  She is uncertain of any fevers.  She supposedly received 1 nebulized treatment at facility as well as 1 in route by EMS for which they reported.     ED Course: Afebrile, /83, , RR 32, 95% on BiPAP. EKG unavailable for my review. ECG revealed sinus tachycardia at a rate of 118 beats per minute with NE interval 166 , QRS of 94 , QTc of 445.  No acute injury pattern.  Previous EKG on August 11, 2023 revealed no significant changes, per ED physician. Labs: H&H 10.7/34.9. Glucose 205. Chloride 96. BNP 85. Troponin 49, 189. Influenza A+. Covid-19 and RSV negative. Blood gas: pH 7.29, repeat 7.33, pCO2 74, repeat 66, pO2 25, repeat 37, sO2 27, repeat 59. See imaging results below. Duoneb, magnesium, and solumedrol given in ED. Patient will be admitted and continue to follow under the care of Dr. Mahajan. I was asked to do H&P and place initial admission orders.     Past Medical History  No past medical history on file.    Surgical History  No past surgical history on file.     Social History  States she smokes cigarettes socially. Occasional alcohol use. Frequent marijuana use.  "Wheelchair bound. From [facility].    Family History  No family history on file.     Allergies  Patient has no allergy information on record.    Review of Systems   10 Point review of systems was performed and is negative except for what is stated in the HPI above.  Physical Exam  Constitutional:       Comments: Morbidly obese   HENT:      Head: Normocephalic and atraumatic.      Nose: Nose normal.      Mouth/Throat:      Mouth: Mucous membranes are moist.      Pharynx: Oropharynx is clear.   Eyes:      Extraocular Movements: Extraocular movements intact.      Conjunctiva/sclera: Conjunctivae normal.      Pupils: Pupils are equal, round, and reactive to light.   Cardiovascular:      Rate and Rhythm: Regular rhythm. Tachycardia present.      Pulses: Normal pulses.      Heart sounds: Normal heart sounds.   Pulmonary:      Effort: Pulmonary effort is normal.      Comments: Conversational dyspnea noted. Tachypneic intermittently. On 5L oxygen NC (refusing BiPAP). Wheezes noted. Diminished bases.  Abdominal:      General: Abdomen is flat. Bowel sounds are normal.      Palpations: Abdomen is soft.   Musculoskeletal:         General: Normal range of motion.      Cervical back: Normal range of motion and neck supple.      Comments: BLE edema noted.   Skin:     General: Skin is warm and dry.      Capillary Refill: Capillary refill takes 2 to 3 seconds.   Neurological:      General: No focal deficit present.      Mental Status: She is alert and oriented to person, place, and time.   Psychiatric:         Mood and Affect: Mood normal.         Behavior: Behavior normal.         Thought Content: Thought content normal.         Judgment: Judgment normal.          Last Recorded Vitals  Blood pressure 105/63, pulse 98, temperature 36.8 °C (98.2 °F), resp. rate 22, height 1.676 m (5' 6\"), weight (!) 177 kg (390 lb), SpO2 97 %.    Relevant Results  Results for orders placed or performed during the hospital encounter of " 01/14/24 (from the past 24 hour(s))   RSV PCR   Result Value Ref Range    RSV PCR Not Detected Not Detected   Sars-CoV-2 and Influenza A/B PCR   Result Value Ref Range    Flu A Result Detected (A) Not Detected    Flu B Result Not Detected Not Detected    Coronavirus 2019, PCR Not Detected Not Detected   BLOOD GAS VENOUS FULL PANEL   Result Value Ref Range    POCT pH, Venous 7.29 (L) 7.33 - 7.43 pH    POCT pCO2, Venous 74 (HH) 41 - 51 mm Hg    POCT pO2, Venous 25 (L) 35 - 45 mm Hg    POCT SO2, Venous 27 (L) 45 - 75 %    POCT Oxy Hemoglobin, Venous 26.7 (L) 45.0 - 75.0 %    POCT Hematocrit Calculated, Venous 34.0 (L) 36.0 - 46.0 %    POCT Sodium, Venous 138 136 - 145 mmol/L    POCT Potassium, Venous 4.3 3.5 - 5.3 mmol/L    POCT Chloride, Venous 98 98 - 107 mmol/L    POCT Ionized Calicum, Venous 1.15 1.10 - 1.33 mmol/L    POCT Glucose, Venous 233 (H) 74 - 99 mg/dL    POCT Lactate, Venous 1.6 0.4 - 2.0 mmol/L    POCT Base Excess, Venous 6.9 (H) -2.0 - 3.0 mmol/L    POCT HCO3 Calculated, Venous 35.6 (H) 22.0 - 26.0 mmol/L    POCT Hemoglobin, Venous 11.3 (L) 12.0 - 16.0 g/dL    POCT Anion Gap, Venous 9.0 (L) 10.0 - 25.0 mmol/L    Patient Temperature 37.0 degrees Celsius    FiO2 40 %    Flow 6.0 LPM    Critical Called By MORA BROWN RRT     Critical Called To DR JENKINS     Critical Call Time 9.0000     Critical Read Back Y    Comprehensive metabolic panel   Result Value Ref Range    Glucose 205 (H) 74 - 99 mg/dL    Sodium 138 136 - 145 mmol/L    Potassium 4.1 3.5 - 5.3 mmol/L    Chloride 96 (L) 98 - 107 mmol/L    Bicarbonate 32 21 - 32 mmol/L    Anion Gap 14 10 - 20 mmol/L    Urea Nitrogen 8 6 - 23 mg/dL    Creatinine 0.83 0.50 - 1.05 mg/dL    eGFR 79 >60 mL/min/1.73m*2    Calcium 8.9 8.6 - 10.3 mg/dL    Albumin 3.8 3.4 - 5.0 g/dL    Alkaline Phosphatase 59 33 - 136 U/L    Total Protein 8.3 (H) 6.4 - 8.2 g/dL    AST 17 9 - 39 U/L    Bilirubin, Total 0.4 0.0 - 1.2 mg/dL    ALT 14 7 - 45 U/L   Troponin I, High Sensitivity    Result Value Ref Range    Troponin I, High Sensitivity 49 (H) 0 - 13 ng/L   B-type natriuretic peptide   Result Value Ref Range    BNP 85 0 - 99 pg/mL   SST TOP   Result Value Ref Range    Extra Tube Hold for add-ons.    BLOOD GAS VENOUS   Result Value Ref Range    POCT pH, Venous 7.33 7.33 - 7.43 pH    POCT pCO2, Venous 66 (H) 41 - 51 mm Hg    POCT pO2, Venous 37 35 - 45 mm Hg    POCT SO2, Venous 59 45 - 75 %    POCT Oxy Hemoglobin, Venous 57.7 45.0 - 75.0 %    POCT Base Excess, Venous 6.6 (H) -2.0 - 3.0 mmol/L    POCT HCO3 Calculated, Venous 34.8 (H) 22.0 - 26.0 mmol/L    Patient Temperature 37.0 degrees Celsius    FiO2 40 %    Test Comment ICU-S    Troponin I, High Sensitivity   Result Value Ref Range    Troponin I, High Sensitivity 189 (HH) 0 - 13 ng/L   CBC and Auto Differential   Result Value Ref Range    WBC 8.3 4.4 - 11.3 x10*3/uL    nRBC 0.0 0.0 - 0.0 /100 WBCs    RBC 4.04 4.00 - 5.20 x10*6/uL    Hemoglobin 10.7 (L) 12.0 - 16.0 g/dL    Hematocrit 34.9 (L) 36.0 - 46.0 %    MCV 86 80 - 100 fL    MCH 26.5 26.0 - 34.0 pg    MCHC 30.7 (L) 32.0 - 36.0 g/dL    RDW 15.7 (H) 11.5 - 14.5 %    Platelets 192 150 - 450 x10*3/uL    Neutrophils % 88.5 40.0 - 80.0 %    Immature Granulocytes %, Automated 0.4 0.0 - 0.9 %    Lymphocytes % 5.6 13.0 - 44.0 %    Monocytes % 5.2 2.0 - 10.0 %    Eosinophils % 0.2 0.0 - 6.0 %    Basophils % 0.1 0.0 - 2.0 %    Neutrophils Absolute 7.33 1.20 - 7.70 x10*3/uL    Immature Granulocytes Absolute, Automated 0.03 0.00 - 0.70 x10*3/uL    Lymphocytes Absolute 0.46 (L) 1.20 - 4.80 x10*3/uL    Monocytes Absolute 0.43 0.10 - 1.00 x10*3/uL    Eosinophils Absolute 0.02 0.00 - 0.70 x10*3/uL    Basophils Absolute 0.01 0.00 - 0.10 x10*3/uL   Coagulation Screen   Result Value Ref Range    Protime 12.7 9.8 - 12.8 seconds    INR 1.1 0.9 - 1.1    aPTT 34 27 - 38 seconds   Magnesium   Result Value Ref Range    Magnesium 1.95 1.60 - 2.40 mg/dL   Troponin I, High Sensitivity   Result Value Ref Range     Troponin I, High Sensitivity 648 (HH) 0 - 13 ng/L   Heparin Assay, UFH   Result Value Ref Range    Heparin Unfractionated <0.1 See Comment Below for Therapeutic Ranges IU/mL   POCT GLUCOSE   Result Value Ref Range    POCT Glucose 234 (H) 74 - 99 mg/dL     XR chest 1 view    Result Date: 1/15/2024  Interpreted By:  Edin You, STUDY: XR CHEST 1 VIEW;  1/15/2024 12:23 am   INDICATION: Signs/Symptoms:SOB.   COMPARISON: 08/11/2023   ACCESSION NUMBER(S): [id number]   ORDERING CLINICIAN: RIVAS JENKINS   FINDINGS:     Cardiomegaly. The pulmonary vasculature is congested centrally and indistinct peripherally. No focal consolidation. No pneumothorax.       Vascular congestion, with or without mild interstitial edema, evaluation limited due to large body habitus and poor inspiration.     MACRO: None.   Signed by: Edin You 1/15/2024 1:17 AM Dictation workstation:   [id number]        Assessment/Plan   Principal Problem:    Shortness of breath    64 year old female with a past medical history of COPD chronically on 4 L oxygen, DIONICIO with CPAP, diabetes, hypertension, hyperlipidemia, heart failure, morbid obesity, who presented to [facility] ED today via EMS from nursing facility with shortness of breath.  Per ED physician, “Facility was reporting that patient was 30% on room air although we do believe this is likely to be inaccurate as she is still mentating appropriately.  EMS reported that she was in the 70s when they arrived.  They placed her on nonrebreather and she did improve into the mid to low 90s.”   She reports that she has been short of breath for a few days now. Cardiology consulted. Continue heparin, steroids, nebulizers, and BiPAP as needed. Patient to be admitted to hospital for further medical management.    #COPD Exacerbation on 4L continuous oxygen at home  #Acute on chronic respiratory failure with hypoxia and hypercapnia and supplemental oxygen requirement  #Influenza A+  #Shortness of  breath  #Hx of DIONICIO  #Morbid obesity  Admit to inpatient/telemetry to Dr. Mahajan  See imaging results above  Continue BiPAP as needed. Wean BiPAP as able  Titrate oxygen to maintain sats >90%  Nebulizers  Solu-medrol 40mg IVP every 8 hours  Incentive spirometer  Droplet Isolation  UA ordered  Repeat labs in AM    #Suspect NSTEMI  #Elevated troponin   Consult cardiology and appreciate recs  Echocardiogram 8/15/23: EF 60-65%  NPO until cleared by cardiology  Aspirin ordered  Troponin 49, 189. Will trend.  Initiate heparin infusion    #Acute on chronic anemia  H&H 10.7/34.9  Continue to monitor    Chronic issues  #DMII  #HTN  #HLD  #CHF  Continue home meds as appropriate when nurse completes home medication reconciliation  SSI with hypoglycemic protocol  Full code  Smoking cessation  CPAP    #DVT prophylaxis  Heparin infusion  SCD's    1/15: resting off bipap, doing ok has fluenza which is likely cause, troponins risking suspect type II    I spent 75 minutes in the professional and overall care of this patient.    Lonnie Mahajan, DO

## 2024-01-15 NOTE — PROGRESS NOTES
Pharmacy Medication History Review    Jacinta Castrejon is a 64 y.o. female admitted for Shortness of breath. Pharmacy reviewed the patient's [id number] medications and allergies for accuracy.    The list below reflectives the updated PTA list. Please review each medication in order reconciliation for additional clarification and justification.  Prior to Admission Medications   Prescriptions Last Dose Informant Patient Reported? Taking?   DULoxetine (Cymbalta) 60 mg DR capsule Unknown Other Yes Yes   Sig: Take 1 capsule (60 mg) by mouth once daily.   LORazepam (Ativan) 1 mg tablet Unknown Other Yes Yes   Sig: Take 1 tablet (1 mg) by mouth every 8 hours if needed for anxiety.   acetaminophen (Tylenol) 500 mg tablet Unknown Other Yes Yes   Sig: Take 2 tablets (1,000 mg) by mouth every 8 hours if needed.   albuterol 2.5 mg /3 mL (0.083 %) nebulizer solution Unknown Other Yes Yes   Sig: Inhale 3 mL (2.5 mg) every 6 hours if needed for wheezing or shortness of breath.   ammonium lactate (Lac-Hydrin) 12 % lotion Unknown Other Yes Yes   Sig: Apply 1 Application topically if needed for dry skin. BLE   aspirin 81 mg chewable tablet Unknown Other Yes Yes   Sig: Chew 1 tablet (81 mg) once daily.   atorvastatin (Lipitor) 40 mg tablet Unknown Other Yes Yes   Sig: Take 1 tablet (40 mg) by mouth once daily at bedtime.   bisacodyl (Dulcolax) 10 mg suppository Unknown Other Yes Yes   Sig: Insert 1 suppository (10 mg) into the rectum once daily as needed for constipation.   budesonide (Pulmicort) 0.5 mg/2 mL nebulizer solution Unknown Other Yes Yes   Sig: Inhale 2 mL (0.5 mg) every 12 hours.   butalbital-aspirin-caffeine (Fiorinal) -40 mg capsule Unknown Other Yes Yes   Sig: Take 1 capsule by mouth every 6 hours if needed for headaches.   calcium carbonate (Tums) 200 mg calcium chewable tablet Unknown Other Yes Yes   Sig: Chew 1 tablet (500 mg) every 12 hours if needed for heartburn.   capsicum (Zostrix) 0.075 % topical  cream Unknown Other Yes Yes   Sig: Apply 1 Application topically 3 times a day. Bilateral knees/shoulders   chlorhexidine (Peridex) 0.12 % solution Unknown Other Yes Yes   Sig: Place 15 mL into mouth between cheek and gum once daily.   cyclobenzaprine (Flexeril) 10 mg tablet Unknown Other Yes Yes   Sig: Take 1 tablet (10 mg) by mouth every 12 hours.   diclofenac sodium 1 % kit Unknown Other Yes Yes   Sig: Apply 1 Application topically 4 times a day.   docusate sodium (Colace) 100 mg capsule Unknown Other Yes Yes   Sig: Take 1 capsule (100 mg) by mouth 2 times a day. Hold for loose stools   ergocalciferol (Vitamin D-2) 1.25 MG (36790 UT) capsule Unknown Other Yes Yes   Sig: Take 1 capsule (50,000 Units) by mouth 2 times a week. Every Monday and Thursday   famotidine (Pepcid) 20 mg tablet Unknown Other Yes Yes   Sig: Take 1 tablet (20 mg) by mouth once daily.   formoterol (Perforomist) 20 mcg/2 mL nebulizer solution Unknown Other Yes Yes   Sig: Inhale 2 mL (20 mcg) every 12 hours.   furosemide (Lasix) 40 mg tablet Unknown Other Yes Yes   Sig: Take 1 tablet (40 mg) by mouth once daily.   hydrocortisone 1 % cream Unknown Other Yes Yes   Sig: Apply 1 Application topically every 12 hours if needed for rash.   insulin glargine (Lantus) 100 unit/mL (3 mL) pen Unknown Other Yes Yes   Sig: Inject 26 Units under the skin twice a day.   insulin lispro (HumaLOG KwikPen Insulin) 100 unit/mL injection Unknown Other Yes Yes   Sig: Inject under the skin 3 times a day before meals. Per sliding scale   insulin lispro (HumaLOG) 100 unit/mL injection Unknown Other Yes Yes   Sig: Inject 0.1 mL (10 Units) under the skin 3 times a day with meals.   mirabegron (Myrbetriq) 25 mg tablet extended release 24 hr 24 hr tablet Unknown Other Yes Yes   Sig: Take 1 tablet (25 mg) by mouth once daily.   montelukast (Singulair) 10 mg tablet Unknown Other Yes Yes   Sig: Take 1 tablet (10 mg) by mouth once daily at bedtime.    neomycin-bacitracnZn-polymyxnB (Triple Antibiotic) ointment Unknown Other Yes Yes   Sig: Apply 1 Application topically if needed (excoriation). Under Bilateral Breasts   nitroglycerin (Nitrostat) 0.4 mg SL tablet Unknown Other Yes Yes   Sig: Place 1 tablet (0.4 mg) under the tongue every 5 minutes if needed for chest pain.   omeprazole (PriLOSEC) 20 mg DR capsule Unknown Other Yes Yes   Sig: Take 1 capsule (20 mg) by mouth once daily in the morning. Take before meals.   oxyCODONE-acetaminophen (Percocet) 5-325 mg tablet Unknown Other Yes Yes   Sig: Take 2 tablets by mouth every 6 hours.   polyethylene glycol (Glycolax, Miralax) 17 gram packet Unknown Other Yes Yes   Sig: Take 17 g by mouth once daily.   pregabalin (Lyrica) 200 mg capsule Unknown Other Yes Yes   Sig: Take 1 capsule (200 mg) by mouth twice a day.   sennosides (Senokot) 8.6 mg tablet Unknown Other Yes Yes   Sig: Take 1 tablet (8.6 mg) by mouth 2 times a day.   spironolactone (Aldactone) 25 mg tablet Unknown Other Yes Yes   Sig: Take 0.5 tablets (12.5 mg) by mouth once daily.      Facility-Administered Medications: None        The list below reflectives the updated allergy list. Please review each documented allergy for additional clarification and justification.  Allergies  Reviewed by Aydee Valdez CPhT on 1/15/2024        Severity Reactions Comments    Penicillins High Anaphylaxis, Hives             Below are additional concerns with the patient's PTA list.    Medication list from [facility], printed 01/14/2024 23:02.    Aydee Valdez CPhT

## 2024-01-15 NOTE — ED PROVIDER NOTES
EMERGENCY DEPARTMENT ENCOUNTER      Pt Name: Jacinta Castrejon  MRN: [id number]  Birthdate 1960  Date of evaluation: 1/14/2024  Provider: Lazaro Luong DO    CHIEF COMPLAINT       Chief Complaint   Patient presents with    Shortness of Breath     Pt presents to ED via EMS with c/o shortness of breath. Per nursing home, pt was 34% on 3L NC, no relief with duoneb. Per EMS, pt was 95% on 15L NRB. Pt morbidly obese.       HISTORY OF PRESENT ILLNESS    Jacinta Castrejon is a 64 y.o. female who presents to the emergency department with EMS from [facility] nursing [facility] for reported shortness of breath.  Facility was reporting that patient was 30% on room air although we do believe this is likely to be inaccurate as she is still mentating appropriately.  EMS reported that she was in the 70s when they arrived.  They placed her on nonrebreather and she did improve into the mid to low 90s.  She is alert and oriented to person time and place.  She reports that she has been short of breath for a few days now.  She is uncertain of any fevers.  Denies any further associated symptoms.  She supposedly received 1 nebulized treatment at facility as well as 1 in route by EMS for which they reported.          Nursing Notes were reviewed.    REVIEW OF SYSTEMS     CONSTITUTIONAL: Denies fever, sweats, chills.   NEURO: Denies difficulty walking, numbness, weakness, tingling, headache.   HEENT: Denies sore throat, rhinorrhea, changes in vision.   CARDIO: Denies chest pain, palpitations.  PULM: Endorses shortness of breath.  Denies cough.   GI: Denies abdominal pain, nausea, vomiting, diarrhea, constipation, melena, hematochezia.  : Denies painful urination, frequency, hematuria.   MSK: Denies recent trauma.   SKIN: Denies rash, lesions.   ENDOCRINE: Denies unexpected weight-loss.   HEME: Denies bleeding disorder.     PAST MEDICAL HISTORY   No past medical history on file.  COPD chronically on 1 L oxygen, DIONICIO with CPAP, diabetes,  hypertension, hyperlipidemia, heart failure, morbid obesity  SURGICAL HISTORY     No past surgical history on file.    ALLERGIES     Patient has no allergy information on record.    FAMILY HISTORY     No family history on file.     SOCIAL HISTORY       Social History     Socioeconomic History    Marital status:      Spouse name: Not on file    Number of children: Not on file    Years of education: Not on file    Highest education level: Not on file   Occupational History    Not on file   Tobacco Use    Smoking status: Not on file    Smokeless tobacco: Not on file   Substance and Sexual Activity    Alcohol use: Not on file    Drug use: Not on file    Sexual activity: Not on file   Other Topics Concern    Not on file   Social History Narrative    Not on file     Social Determinants of Health     Financial Resource Strain: Not on file   Food Insecurity: Not on file   Transportation Needs: Not on file   Physical Activity: Not on file   Stress: Not on file   Social Connections: Not on file   Intimate Partner Violence: Not on file   Housing Stability: Not on file       PHYSICAL EXAM   VS: As documented in the triage note from today's date and EMR flowsheet were reviewed.  Gen: Morbidly obese.  Dyspneic speaking only 1-2 words at a time.  Alert and oriented to person time place.  Skin: Warm. Dry. Intact. No rashes or lesions.  Eyes: Pupils equally round and reactive to light. Clear sclera.   HENT: Atraumatic appearance. Mucosal membranes moist. No oral lesions, uvula midline, airway patent.  No meningismal signs trachea is midline.  CV: Tachycardic rate and regular rhythm. S1, S2.  Trace bilateral pedal edema. Warm extremities.  Resp: Increased work of breathing with accessory muscles.  Saturating 100% on nasal cannula.  Tachypneic.  Diffuse inspiratory expiratory wheeze noted as well as diminished lung sounds in the bases.    GI: Soft and nontender. No rebound or guarding. Bowel sounds x4 present.  Garcia sign  McBurney's point tenderness is negative.  MSK: Symmetric muscle bulk. No joint swelling in the extremities. Compartments are soft. Neurovascularly intact x4 extremities. Radial pulses +2 equal bilaterally.  Pedal pulses +2 equal bilaterally.  Neuro: Alert. Moving all extremities. No focal deficits.  Psych: Disheveled.    DIAGNOSTIC RESULTS     RADIOLOGY:   Non-plain film images such as CT, Ultrasound and MRI are read by the radiologist. Plain radiographic images are visualized and preliminarily interpreted by the emergency physician with the below findings: Chest x-ray without signs of pneumonia.      Interpretation per the Radiologist below, if available at the time of this note:    XR chest 1 view   Final Result   Vascular congestion, with or without mild interstitial edema,   evaluation limited due to large body habitus and poor inspiration.             MACRO:   None.        Signed by: Edin You 1/15/2024 1:17 AM   Dictation workstation:   [id number]            ED BEDSIDE ULTRASOUND:   Performed by ED Physician - none    LABS:  Labs Reviewed   SARS-COV-2 AND INFLUENZA A/B PCR - Abnormal       Result Value    Flu A Result Detected (*)     Flu B Result Not Detected      Coronavirus 2019, PCR Not Detected      Narrative:     This assay has received FDA Emergency Use Authorization (EUA) and  is only authorized for the duration of time that circumstances exist to justify the authorization of the emergency use of in vitro diagnostic tests for the detection of SARS-CoV-2 virus and/or diagnosis of COVID-19 infection under section 564(b)(1) of the Act, 21 U.S.C. 360bbb-3(b)(1). Testing for SARS-CoV-2 is only recommended for patients who meet current clinical and/or epidemiological criteria as defined by federal, state, or local public health directives. This assay is an in vitro diagnostic nucleic acid amplification test for the qualitative detection of SARS-CoV-2, Influenza A, and Influenza B from nasopharyngeal  specimens and has been validated for use at [facility]. Negative results do not preclude COVID-19 infections or Influenza A/B infections, and should not be used as the sole basis for diagnosis, treatment, or other management decisions. If Influenza A/B and RSV PCR results are negative, testing for Parainfluenza virus, Adenovirus and Metapneumovirus is routinely performed for [facility] pediatric oncology and intensive care inpatients, and is available on other patients by placing an add-on request.    BLOOD GAS VENOUS FULL PANEL - Abnormal    POCT pH, Venous 7.29 (*)     POCT pCO2, Venous 74 (*)     POCT pO2, Venous 25 (*)     POCT SO2, Venous 27 (*)     POCT Oxy Hemoglobin, Venous 26.7 (*)     POCT Hematocrit Calculated, Venous 34.0 (*)     POCT Sodium, Venous 138      POCT Potassium, Venous 4.3      POCT Chloride, Venous 98      POCT Ionized Calicum, Venous 1.15      POCT Glucose, Venous 233 (*)     POCT Lactate, Venous 1.6      POCT Base Excess, Venous 6.9 (*)     POCT HCO3 Calculated, Venous 35.6 (*)     POCT Hemoglobin, Venous 11.3 (*)     POCT Anion Gap, Venous 9.0 (*)     Patient Temperature 37.0      FiO2 40      Flow 6.0      Critical Called By MORA BROWN RRT      Critical Called To DR JENKINS      Critical Call Time 9.0000      Critical Read Back Y     COMPREHENSIVE METABOLIC PANEL - Abnormal    Glucose 205 (*)     Sodium 138      Potassium 4.1      Chloride 96 (*)     Bicarbonate 32      Anion Gap 14      Urea Nitrogen 8      Creatinine 0.83      eGFR 79      Calcium 8.9      Albumin 3.8      Alkaline Phosphatase 59      Total Protein 8.3 (*)     AST 17      Bilirubin, Total 0.4      ALT 14     TROPONIN I, HIGH SENSITIVITY - Abnormal    Troponin I, High Sensitivity 49 (*)     Narrative:     Less than 99th percentile of normal range cutoff-  Female and children under 18 years old <14 ng/L; Male <21 ng/L: Negative  Repeat testing should be performed if clinically indicated.     Female and  children under 18 years old 14-50 ng/L; Male 21-50 ng/L:  Consistent with possible cardiac damage and possible increased clinical   risk. Serial measurements may help to assess extent of myocardial damage.     >50 ng/L: Consistent with cardiac damage, increased clinical risk and  myocardial infarction. Serial measurements may help assess extent of   myocardial damage.      NOTE: Children less than 1 year old may have higher baseline troponin   levels and results should be interpreted in conjunction with the overall   clinical context.     NOTE: Troponin I testing is performed using a different   testing methodology at [facility] than at other   [facility]. Direct result comparisons should only   be made within the same method.   BLOOD GAS VENOUS - Abnormal    POCT pH, Venous 7.33      POCT pCO2, Venous 66 (*)     POCT pO2, Venous 37      POCT SO2, Venous 59      POCT Oxy Hemoglobin, Venous 57.7      POCT Base Excess, Venous 6.6 (*)     POCT HCO3 Calculated, Venous 34.8 (*)     Patient Temperature 37.0      FiO2 40      Test Comment ICU-S     CBC WITH AUTO DIFFERENTIAL - Abnormal    WBC 8.3      nRBC 0.0      RBC 4.04      Hemoglobin 10.7 (*)     Hematocrit 34.9 (*)     MCV 86      MCH 26.5      MCHC 30.7 (*)     RDW 15.7 (*)     Platelets 192      Neutrophils % 88.5      Immature Granulocytes %, Automated 0.4      Lymphocytes % 5.6      Monocytes % 5.2      Eosinophils % 0.2      Basophils % 0.1      Neutrophils Absolute 7.33      Immature Granulocytes Absolute, Automated 0.03      Lymphocytes Absolute 0.46 (*)     Monocytes Absolute 0.43      Eosinophils Absolute 0.02      Basophils Absolute 0.01     RSV PCR - Normal    RSV PCR Not Detected      Narrative:     This assay is an FDA-cleared, in vitro diagnostic nucleic acid amplification test for the detection of RSV from nasopharyngeal specimens, and has been validated for use at [facility]. Negative results do not  preclude RSV infections, and should not be used as the sole basis for diagnosis, treatment, or other management decisions. If Influenza A/B and RSV PCR results are negative, testing for Parainfluenza virus, Adenovirus and Metapneumovirus is routinely performed for pediatric oncology and intensive care inpatients at [facility], and is available on other patients by placing an add-on request.       B-TYPE NATRIURETIC PEPTIDE - Normal    BNP 85      Narrative:        <100 pg/mL - Heart failure unlikely  100-299 pg/mL - Intermediate probability of acute heart                  failure exacerbation. Correlate with clinical                  context and patient history.    >=300 pg/mL - Heart Failure likely. Correlate with clinical                  context and patient history.    BNP testing is performed using different testing methodology at [facility] than at other [facility] hospitals. Direct result comparisons should only be made within the same method.      CBC WITH AUTO DIFFERENTIAL       All other labs were within normal range or not returned as of this dictation.    EMERGENCY DEPARTMENT COURSE/MDM:   Vitals:    Vitals:    01/14/24 2357 01/15/24 0000 01/15/24 0045   BP:   121/80   Pulse:   (!) 118   Resp: (!) 38  24   Temp:   36.8 °C (98.2 °F)   SpO2:  100% 100%   Weight:      Height:          I reviewed the patient's triage vitals and they are tachypneic as well as tachycardic suspect secondary to respiratory drive will initiate BiPAP.  Patient in respiratory distress with retractions.  Respiratory stat was called upon arrival.    Due to the above findings the following was ordered basic labs to include BNP viral swabs DuoNebs x 2 BiPAP and chest x-ray.    Lab work is remarkable for influenza A positive she did report that she had been having symptoms for quite some time now she is not a Tamiflu candidate.  Initial VBG shows acidosis primarily respiratory repeat is improving significantly.  Will continue BiPAP  patient breathing improved.  Cardiac troponin minimally elevated suspect demand no acute injury pattern on EKG.  Patient without chest pain.  Mild anemia improved from baseline.  No leukocytosis making bacterial infectious etiology less likely.  Patient reevaluated she is feeling improved I do suspect this is likely a COPD exacerbation coupled with influenza she was given magnesium as well as Solu-Medrol for suspicion as well.  She is appreciative of care she is speaking in full sentences at this time.  Discussed findings with the admitting physician they have agreed to accept the patient for further treatment and evaluation.  Repeat cardiac troponin still pending at this time primary team aware.  Patient resting comfortably.  Hemodynamically stable.    Critical Care    Performed by: Lazaro Luong DO  Authorized by: Lazaro Luong DO    Critical care provider statement:     Critical care time (minutes):  38    Critical care time was exclusive of:  Separately billable procedures and treating other patients    Critical care was necessary to treat or prevent imminent or life-threatening deterioration of the following conditions:  Respiratory failure    Critical care was time spent personally by me on the following activities:  Discussions with primary provider, evaluation of patient's response to treatment, pulse oximetry, ordering and review of radiographic studies, ordering and review of laboratory studies, ordering and performing treatments and interventions, review of old charts and re-evaluation of patient's condition    Care discussed with: admitting provider          ED Course as of 01/15/24 0625   Mon Flex 15, 2024   0111 Interpreted by the Emergency Department Attending: ECG revealed sinus tachycardia at a rate of 118 beats per minute with KY interval 166 , QRS of 94 , QTc of 445.  No acute injury pattern.  Previous EKG on August 11, 2023 revealed no significant changes.    [MG]      ED Course User  Index  [MG] Lazaro Luong DO         Diagnoses as of 01/15/24 0625   Shortness of breath   Acute respiratory failure with hypercapnia (CMS/HCC)   Influenza       Patient was counseled regarding labs, imaging, likely diagnosis, and plan. All questions were answered.     ------------------------------------------------------------------  Information provided by the patient and EMS  Consults hospitalist  Due to social and economic determinants chronic resides at skilled nursing facility  Past medical history complicating workup COPD, heart failure  Previous medical records reviewed previous hospital admission for sepsis 8/11/2023.  Considered CTA or D-dimer although based off of presentation clear source identified lower concern for PE at this time if no improvement will consider.    ------------------------------------------------------------------  ED Medications administered this visit:    Medications   oxygen (O2) therapy (has no administration in time range)   ipratropium-albuteroL (Duo-Neb) 0.5-2.5 mg/3 mL nebulizer solution 6 mL (6 mL nebulization Given 1/14/24 2345)   methylPREDNISolone sod succinate (SOLU-Medrol) injection 125 mg (125 mg intravenous Given 1/15/24 0038)   magnesium sulfate IV 2 g (0 g intravenous Stopped 1/15/24 0059)       Final Impression:   1. Shortness of breath    2. Acute respiratory failure with hypercapnia (CMS/HCC)    3. Influenza          Lazaro Luong DO    (Please note that portions of this note were completed with a voice recognition program.  Efforts were made to edit the dictations but occasionally words are mis-transcribed.)     Lazaro Luong DO  01/15/24 0625

## 2024-01-15 NOTE — ED TRIAGE NOTES
Pt presents to ED via EMS with c/o shortness of breath. Per nursing home, pt was 34% on 3L NC, no relief with duoneb. Per EMS, pt was 95% on 15L NRB. Pt morbidly obese.

## 2024-01-16 LAB
ALBUMIN SERPL BCP-MCNC: 3.6 G/DL (ref 3.4–5)
ALP SERPL-CCNC: 50 U/L (ref 33–136)
ALT SERPL W P-5'-P-CCNC: 11 U/L (ref 7–45)
ANION GAP SERPL CALC-SCNC: 10 MMOL/L (ref 10–20)
AST SERPL W P-5'-P-CCNC: 16 U/L (ref 9–39)
BASOPHILS # BLD AUTO: 0.01 X10*3/UL (ref 0–0.1)
BASOPHILS NFR BLD AUTO: 0.1 %
BILIRUB SERPL-MCNC: 0.3 MG/DL (ref 0–1.2)
BUN SERPL-MCNC: 18 MG/DL (ref 6–23)
CALCIUM SERPL-MCNC: 9.1 MG/DL (ref 8.6–10.3)
CARDIAC TROPONIN I PNL SERPL HS: 324 NG/L (ref 0–13)
CHLORIDE SERPL-SCNC: 97 MMOL/L (ref 98–107)
CO2 SERPL-SCNC: 34 MMOL/L (ref 21–32)
CREAT SERPL-MCNC: 0.81 MG/DL (ref 0.5–1.05)
EGFRCR SERPLBLD CKD-EPI 2021: 82 ML/MIN/1.73M*2
EOSINOPHIL # BLD AUTO: 0 X10*3/UL (ref 0–0.7)
EOSINOPHIL NFR BLD AUTO: 0 %
ERYTHROCYTE [DISTWIDTH] IN BLOOD BY AUTOMATED COUNT: 15.7 % (ref 11.5–14.5)
GLUCOSE BLD MANUAL STRIP-MCNC: 187 MG/DL (ref 74–99)
GLUCOSE BLD MANUAL STRIP-MCNC: 327 MG/DL (ref 74–99)
GLUCOSE SERPL-MCNC: 250 MG/DL (ref 74–99)
HCT VFR BLD AUTO: 34.3 % (ref 36–46)
HGB BLD-MCNC: 10.9 G/DL (ref 12–16)
HOLD SPECIMEN: NORMAL
IMM GRANULOCYTES # BLD AUTO: 0.03 X10*3/UL (ref 0–0.7)
IMM GRANULOCYTES NFR BLD AUTO: 0.3 % (ref 0–0.9)
LYMPHOCYTES # BLD AUTO: 1.03 X10*3/UL (ref 1.2–4.8)
LYMPHOCYTES NFR BLD AUTO: 9.6 %
MAGNESIUM SERPL-MCNC: 2.28 MG/DL (ref 1.6–2.4)
MCH RBC QN AUTO: 27 PG (ref 26–34)
MCHC RBC AUTO-ENTMCNC: 31.8 G/DL (ref 32–36)
MCV RBC AUTO: 85 FL (ref 80–100)
MONOCYTES # BLD AUTO: 0.47 X10*3/UL (ref 0.1–1)
MONOCYTES NFR BLD AUTO: 4.4 %
NEUTROPHILS # BLD AUTO: 9.19 X10*3/UL (ref 1.2–7.7)
NEUTROPHILS NFR BLD AUTO: 85.6 %
NRBC BLD-RTO: 0 /100 WBCS (ref 0–0)
PLATELET # BLD AUTO: 227 X10*3/UL (ref 150–450)
POTASSIUM SERPL-SCNC: 4.2 MMOL/L (ref 3.5–5.3)
PROT SERPL-MCNC: 7.9 G/DL (ref 6.4–8.2)
RBC # BLD AUTO: 4.03 X10*6/UL (ref 4–5.2)
SODIUM SERPL-SCNC: 137 MMOL/L (ref 136–145)
UFH PPP CHRO-ACNC: 0.1 IU/ML
UFH PPP CHRO-ACNC: 0.3 IU/ML
UFH PPP CHRO-ACNC: 0.3 IU/ML
WBC # BLD AUTO: 10.7 X10*3/UL (ref 4.4–11.3)

## 2024-01-16 PROCEDURE — 36415 COLL VENOUS BLD VENIPUNCTURE: CPT | Performed by: STUDENT IN AN ORGANIZED HEALTH CARE EDUCATION/TRAINING PROGRAM

## 2024-01-16 PROCEDURE — 2500000001 HC RX 250 WO HCPCS SELF ADMINISTERED DRUGS (ALT 637 FOR MEDICARE OP): Performed by: INTERNAL MEDICINE

## 2024-01-16 PROCEDURE — 84484 ASSAY OF TROPONIN QUANT: CPT | Performed by: STUDENT IN AN ORGANIZED HEALTH CARE EDUCATION/TRAINING PROGRAM

## 2024-01-16 PROCEDURE — 85025 COMPLETE CBC W/AUTO DIFF WBC: CPT | Performed by: STUDENT IN AN ORGANIZED HEALTH CARE EDUCATION/TRAINING PROGRAM

## 2024-01-16 PROCEDURE — 80053 COMPREHEN METABOLIC PANEL: CPT | Performed by: STUDENT IN AN ORGANIZED HEALTH CARE EDUCATION/TRAINING PROGRAM

## 2024-01-16 PROCEDURE — 2500000004 HC RX 250 GENERAL PHARMACY W/ HCPCS (ALT 636 FOR OP/ED)

## 2024-01-16 PROCEDURE — 94640 AIRWAY INHALATION TREATMENT: CPT

## 2024-01-16 PROCEDURE — 99223 1ST HOSP IP/OBS HIGH 75: CPT | Performed by: INTERNAL MEDICINE

## 2024-01-16 PROCEDURE — 2500000001 HC RX 250 WO HCPCS SELF ADMINISTERED DRUGS (ALT 637 FOR MEDICARE OP): Performed by: STUDENT IN AN ORGANIZED HEALTH CARE EDUCATION/TRAINING PROGRAM

## 2024-01-16 PROCEDURE — 2500000002 HC RX 250 W HCPCS SELF ADMINISTERED DRUGS (ALT 637 FOR MEDICARE OP, ALT 636 FOR OP/ED): Performed by: STUDENT IN AN ORGANIZED HEALTH CARE EDUCATION/TRAINING PROGRAM

## 2024-01-16 PROCEDURE — 2060000001 HC INTERMEDIATE ICU ROOM DAILY

## 2024-01-16 PROCEDURE — 36415 COLL VENOUS BLD VENIPUNCTURE: CPT

## 2024-01-16 PROCEDURE — 82947 ASSAY GLUCOSE BLOOD QUANT: CPT

## 2024-01-16 PROCEDURE — 83735 ASSAY OF MAGNESIUM: CPT | Performed by: STUDENT IN AN ORGANIZED HEALTH CARE EDUCATION/TRAINING PROGRAM

## 2024-01-16 PROCEDURE — 2500000004 HC RX 250 GENERAL PHARMACY W/ HCPCS (ALT 636 FOR OP/ED): Performed by: STUDENT IN AN ORGANIZED HEALTH CARE EDUCATION/TRAINING PROGRAM

## 2024-01-16 PROCEDURE — 99233 SBSQ HOSP IP/OBS HIGH 50: CPT | Performed by: STUDENT IN AN ORGANIZED HEALTH CARE EDUCATION/TRAINING PROGRAM

## 2024-01-16 PROCEDURE — 85520 HEPARIN ASSAY: CPT | Performed by: STUDENT IN AN ORGANIZED HEALTH CARE EDUCATION/TRAINING PROGRAM

## 2024-01-16 RX ORDER — NAPROXEN SODIUM 220 MG/1
81 TABLET, FILM COATED ORAL DAILY
Status: DISCONTINUED | OUTPATIENT
Start: 2024-01-16 | End: 2024-01-24 | Stop reason: HOSPADM

## 2024-01-16 RX ORDER — FUROSEMIDE 40 MG/1
40 TABLET ORAL DAILY
Status: DISCONTINUED | OUTPATIENT
Start: 2024-01-16 | End: 2024-01-24 | Stop reason: HOSPADM

## 2024-01-16 RX ORDER — SENNOSIDES 8.6 MG/1
1 TABLET ORAL 2 TIMES DAILY
Status: DISCONTINUED | OUTPATIENT
Start: 2024-01-16 | End: 2024-01-24 | Stop reason: HOSPADM

## 2024-01-16 RX ORDER — ACETAMINOPHEN 325 MG/1
1000 TABLET ORAL EVERY 8 HOURS PRN
Status: DISCONTINUED | OUTPATIENT
Start: 2024-01-16 | End: 2024-01-24 | Stop reason: HOSPADM

## 2024-01-16 RX ORDER — OXYCODONE AND ACETAMINOPHEN 5; 325 MG/1; MG/1
1 TABLET ORAL EVERY 6 HOURS PRN
Status: DISCONTINUED | OUTPATIENT
Start: 2024-01-16 | End: 2024-01-24 | Stop reason: HOSPADM

## 2024-01-16 RX ORDER — BUTALBITAL, ACETAMINOPHEN AND CAFFEINE 50; 325; 40 MG/1; MG/1; MG/1
1 TABLET ORAL EVERY 6 HOURS PRN
Status: DISCONTINUED | OUTPATIENT
Start: 2024-01-16 | End: 2024-01-24 | Stop reason: HOSPADM

## 2024-01-16 RX ORDER — BISACODYL 10 MG/1
10 SUPPOSITORY RECTAL DAILY PRN
Status: DISCONTINUED | OUTPATIENT
Start: 2024-01-16 | End: 2024-01-24 | Stop reason: HOSPADM

## 2024-01-16 RX ORDER — ALBUTEROL SULFATE 0.83 MG/ML
2.5 SOLUTION RESPIRATORY (INHALATION) EVERY 2 HOUR PRN
Status: DISCONTINUED | OUTPATIENT
Start: 2024-01-16 | End: 2024-01-24 | Stop reason: HOSPADM

## 2024-01-16 RX ORDER — ALBUTEROL SULFATE 0.83 MG/ML
2.5 SOLUTION RESPIRATORY (INHALATION) EVERY 6 HOURS PRN
Status: DISCONTINUED | OUTPATIENT
Start: 2024-01-16 | End: 2024-01-24 | Stop reason: HOSPADM

## 2024-01-16 RX ORDER — FORMOTEROL FUMARATE DIHYDRATE 20 UG/2ML
20 SOLUTION RESPIRATORY (INHALATION)
Status: DISCONTINUED | OUTPATIENT
Start: 2024-01-16 | End: 2024-01-24 | Stop reason: HOSPADM

## 2024-01-16 RX ORDER — ERGOCALCIFEROL 1.25 MG/1
50000 CAPSULE ORAL 2 TIMES WEEKLY
Status: DISCONTINUED | OUTPATIENT
Start: 2024-01-16 | End: 2024-01-24 | Stop reason: HOSPADM

## 2024-01-16 RX ORDER — LORAZEPAM 0.5 MG/1
1 TABLET ORAL EVERY 8 HOURS PRN
Status: DISCONTINUED | OUTPATIENT
Start: 2024-01-16 | End: 2024-01-24 | Stop reason: HOSPADM

## 2024-01-16 RX ORDER — BUTALBITAL, ASPIRIN, AND CAFFEINE 325; 50; 40 MG/1; MG/1; MG/1
1 CAPSULE ORAL EVERY 6 HOURS PRN
Status: DISCONTINUED | OUTPATIENT
Start: 2024-01-16 | End: 2024-01-16

## 2024-01-16 RX ORDER — CALCIUM CARBONATE 200(500)MG
1 TABLET,CHEWABLE ORAL EVERY 12 HOURS PRN
Status: DISCONTINUED | OUTPATIENT
Start: 2024-01-16 | End: 2024-01-24 | Stop reason: HOSPADM

## 2024-01-16 RX ORDER — BUDESONIDE 0.5 MG/2ML
0.5 INHALANT ORAL
Status: DISCONTINUED | OUTPATIENT
Start: 2024-01-16 | End: 2024-01-24 | Stop reason: HOSPADM

## 2024-01-16 RX ORDER — ALBUTEROL SULFATE 0.83 MG/ML
2.5 SOLUTION RESPIRATORY (INHALATION)
Status: DISCONTINUED | OUTPATIENT
Start: 2024-01-16 | End: 2024-01-19

## 2024-01-16 RX ORDER — PANTOPRAZOLE SODIUM 40 MG/1
40 TABLET, DELAYED RELEASE ORAL
Status: DISCONTINUED | OUTPATIENT
Start: 2024-01-17 | End: 2024-01-24 | Stop reason: HOSPADM

## 2024-01-16 RX ORDER — CAPSAICIN 0.03 G/100G
CREAM TOPICAL 3 TIMES DAILY
Status: DISCONTINUED | OUTPATIENT
Start: 2024-01-16 | End: 2024-01-24 | Stop reason: HOSPADM

## 2024-01-16 RX ORDER — FAMOTIDINE 20 MG/1
20 TABLET, FILM COATED ORAL DAILY
Status: DISCONTINUED | OUTPATIENT
Start: 2024-01-16 | End: 2024-01-24 | Stop reason: HOSPADM

## 2024-01-16 RX ORDER — POLYETHYLENE GLYCOL 3350 17 G/17G
17 POWDER, FOR SOLUTION ORAL DAILY
Status: DISCONTINUED | OUTPATIENT
Start: 2024-01-16 | End: 2024-01-16

## 2024-01-16 RX ORDER — ALBUTEROL SULFATE 0.83 MG/ML
2.5 SOLUTION RESPIRATORY (INHALATION) EVERY 6 HOURS PRN
Status: DISCONTINUED | OUTPATIENT
Start: 2024-01-16 | End: 2024-01-16

## 2024-01-16 RX ORDER — CYCLOBENZAPRINE HCL 10 MG
10 TABLET ORAL EVERY 12 HOURS
Status: DISCONTINUED | OUTPATIENT
Start: 2024-01-16 | End: 2024-01-24 | Stop reason: HOSPADM

## 2024-01-16 RX ORDER — SPIRONOLACTONE 25 MG/1
12.5 TABLET ORAL DAILY
Status: DISCONTINUED | OUTPATIENT
Start: 2024-01-16 | End: 2024-01-24 | Stop reason: HOSPADM

## 2024-01-16 RX ORDER — DULOXETIN HYDROCHLORIDE 30 MG/1
60 CAPSULE, DELAYED RELEASE ORAL DAILY
Status: DISCONTINUED | OUTPATIENT
Start: 2024-01-16 | End: 2024-01-24 | Stop reason: HOSPADM

## 2024-01-16 RX ORDER — ATORVASTATIN CALCIUM 40 MG/1
40 TABLET, FILM COATED ORAL NIGHTLY
Status: DISCONTINUED | OUTPATIENT
Start: 2024-01-16 | End: 2024-01-24 | Stop reason: HOSPADM

## 2024-01-16 RX ORDER — MONTELUKAST SODIUM 10 MG/1
10 TABLET ORAL NIGHTLY
Status: DISCONTINUED | OUTPATIENT
Start: 2024-01-16 | End: 2024-01-24 | Stop reason: HOSPADM

## 2024-01-16 RX ADMIN — HEPARIN SODIUM 1000 UNITS/HR: 10000 INJECTION, SOLUTION INTRAVENOUS at 06:10

## 2024-01-16 RX ADMIN — ASPIRIN 81 MG: 81 TABLET, CHEWABLE ORAL at 11:04

## 2024-01-16 RX ADMIN — CYCLOBENZAPRINE 10 MG: 10 TABLET, FILM COATED ORAL at 13:41

## 2024-01-16 RX ADMIN — ALBUTEROL SULFATE 2.5 MG: 2.5 SOLUTION RESPIRATORY (INHALATION) at 12:15

## 2024-01-16 RX ADMIN — METHYLPREDNISOLONE SODIUM SUCCINATE 40 MG: 40 INJECTION, POWDER, FOR SOLUTION INTRAMUSCULAR; INTRAVENOUS at 22:53

## 2024-01-16 RX ADMIN — POLYETHYLENE GLYCOL 3350 17 G: 17 POWDER, FOR SOLUTION ORAL at 11:04

## 2024-01-16 RX ADMIN — METHYLPREDNISOLONE SODIUM SUCCINATE 40 MG: 40 INJECTION, POWDER, FOR SOLUTION INTRAMUSCULAR; INTRAVENOUS at 10:47

## 2024-01-16 RX ADMIN — ALBUTEROL SULFATE 2.5 MG: 2.5 SOLUTION RESPIRATORY (INHALATION) at 19:22

## 2024-01-16 RX ADMIN — ATORVASTATIN CALCIUM 40 MG: 40 TABLET, FILM COATED ORAL at 20:16

## 2024-01-16 RX ADMIN — DULOXETINE HYDROCHLORIDE 60 MG: 30 CAPSULE, DELAYED RELEASE ORAL at 13:41

## 2024-01-16 RX ADMIN — PREGABALIN 200 MG: 75 CAPSULE ORAL at 13:41

## 2024-01-16 RX ADMIN — FORMOTEROL FUMARATE DIHYDRATE 20 MCG: 20 SOLUTION RESPIRATORY (INHALATION) at 19:27

## 2024-01-16 RX ADMIN — GUAIFENESIN 600 MG: 600 TABLET, EXTENDED RELEASE ORAL at 11:03

## 2024-01-16 RX ADMIN — PREGABALIN 200 MG: 75 CAPSULE ORAL at 20:16

## 2024-01-16 RX ADMIN — OXYCODONE HYDROCHLORIDE AND ACETAMINOPHEN 1 TABLET: 5; 325 TABLET ORAL at 23:48

## 2024-01-16 RX ADMIN — FUROSEMIDE 40 MG: 40 TABLET ORAL at 13:41

## 2024-01-16 RX ADMIN — SENNOSIDES 8.6 MG: 8.6 TABLET, FILM COATED ORAL at 20:16

## 2024-01-16 RX ADMIN — BUDESONIDE 0.5 MG: 0.5 INHALANT ORAL at 19:22

## 2024-01-16 RX ADMIN — MONTELUKAST 10 MG: 10 TABLET, FILM COATED ORAL at 20:16

## 2024-01-16 RX ADMIN — FAMOTIDINE 20 MG: 20 TABLET ORAL at 13:41

## 2024-01-16 RX ADMIN — SENNOSIDES 8.6 MG: 8.6 TABLET, FILM COATED ORAL at 13:41

## 2024-01-16 RX ADMIN — INSULIN LISPRO 8 UNITS: 100 INJECTION, SOLUTION INTRAVENOUS; SUBCUTANEOUS at 16:34

## 2024-01-16 RX ADMIN — HEPARIN SODIUM 3000 UNITS: 5000 INJECTION INTRAVENOUS; SUBCUTANEOUS at 11:49

## 2024-01-16 RX ADMIN — GUAIFENESIN 600 MG: 600 TABLET, EXTENDED RELEASE ORAL at 20:16

## 2024-01-16 RX ADMIN — SPIRONOLACTONE 12.5 MG: 25 TABLET, FILM COATED ORAL at 13:41

## 2024-01-16 RX ADMIN — OXYCODONE HYDROCHLORIDE AND ACETAMINOPHEN 1 TABLET: 5; 325 TABLET ORAL at 11:49

## 2024-01-16 ASSESSMENT — PAIN - FUNCTIONAL ASSESSMENT
PAIN_FUNCTIONAL_ASSESSMENT: 0-10

## 2024-01-16 ASSESSMENT — PAIN SCALES - GENERAL
PAINLEVEL_OUTOF10: 0 - NO PAIN
PAINLEVEL_OUTOF10: 8
PAINLEVEL_OUTOF10: 8
PAINLEVEL_OUTOF10: 10 - WORST POSSIBLE PAIN

## 2024-01-16 ASSESSMENT — PAIN DESCRIPTION - ORIENTATION: ORIENTATION: RIGHT

## 2024-01-16 ASSESSMENT — PAIN DESCRIPTION - LOCATION
LOCATION: HEAD
LOCATION: CHEST

## 2024-01-16 NOTE — PROGRESS NOTES
"Jacinta Castrejon is a 64 y.o. female on day 1 of admission presenting with Shortness of breath.    Subjective   Doing ok, upset that roommate made her ill, feeling better and close to baselien       Objective     Physical Exam  Constitutional:       Appearance: Normal appearance.   HENT:      Head: Normocephalic and atraumatic.      Right Ear: Tympanic membrane and ear canal normal.      Left Ear: Tympanic membrane and ear canal normal.      Mouth/Throat:      Mouth: Mucous membranes are moist.      Pharynx: Oropharynx is clear.   Eyes:      Extraocular Movements: Extraocular movements intact.      Conjunctiva/sclera: Conjunctivae normal.      Pupils: Pupils are equal, round, and reactive to light.   Cardiovascular:      Rate and Rhythm: Normal rate and regular rhythm.      Pulses: Normal pulses.      Heart sounds: Normal heart sounds.   Pulmonary:      Effort: Pulmonary effort is normal.      Breath sounds: Normal breath sounds.   Abdominal:      General: Abdomen is flat. Bowel sounds are normal.      Palpations: Abdomen is soft.   Musculoskeletal:         General: Normal range of motion.      Cervical back: Normal range of motion and neck supple.   Skin:     General: Skin is warm and dry.      Capillary Refill: Capillary refill takes 2 to 3 seconds.   Neurological:      General: No focal deficit present.      Mental Status: She is alert and oriented to person, place, and time. Mental status is at baseline.   Psychiatric:         Mood and Affect: Mood normal.         Behavior: Behavior normal.         Thought Content: Thought content normal.         Judgment: Judgment normal.         Last Recorded Vitals  Blood pressure 118/63, pulse 80, temperature 36.1 °C (97 °F), temperature source Temporal, resp. rate 18, height 1.676 m (5' 5.98\"), weight (!) 177 kg (390 lb), SpO2 100 %.  Intake/Output last 3 Shifts:  No intake/output data recorded.    Relevant Results           This patient currently has cardiac telemetry ordered; " if you would like to modify or discontinue the telemetry order, click here to go to the orders activity to modify/discontinue the order.                 Assessment/Plan   Principal Problem:    Shortness of breath    64 year old female with a past medical history of COPD chronically on 4 L oxygen, DIONICIO with CPAP, diabetes, hypertension, hyperlipidemia, heart failure, morbid obesity, who presented to [facility] ED today via EMS from nursing facility with shortness of breath.  Per ED physician, “Facility was reporting that patient was 30% on room air although we do believe this is likely to be inaccurate as she is still mentating appropriately.  EMS reported that she was in the 70s when they arrived.  They placed her on nonrebreather and she did improve into the mid to low 90s.”   She reports that she has been short of breath for a few days now. Cardiology consulted. Continue heparin, steroids, nebulizers, and BiPAP as needed. Patient to be admitted to hospital for further medical management.     #COPD Exacerbation on 4L continuous oxygen at home  #Acute on chronic respiratory failure with hypoxia and hypercapnia and supplemental oxygen requirement  #Influenza A+  #Shortness of breath  #Hx of DIONICIO  #Morbid obesity  Admit to inpatient/telemetry to Dr. Mahajan  See imaging results above  Continue BiPAP as needed. Wean BiPAP as able  Titrate oxygen to maintain sats >90%  Nebulizers  Solu-medrol 40mg IVP every 8 hours  Incentive spirometer  Droplet Isolation  UA ordered  Repeat labs in AM     #Suspect NSTEMI  #Elevated troponin   Consult cardiology and appreciate recs  Echocardiogram 8/15/23: EF 60-65%  NPO until cleared by cardiology  Aspirin ordered  Troponin 49, 189. Will trend.  Initiate heparin infusion     #Acute on chronic anemia  H&H 10.7/34.9  Continue to monitor     Chronic issues  #DMII  #HTN  #HLD  #CHF  Continue home meds as appropriate when nurse completes home medication reconciliation  SSI with hypoglycemic  protocol  Full code  Smoking cessation  CPAP     #DVT prophylaxis  Heparin infusion  SCD's     1/15: resting off bipap, doing ok has fluenza which is likely cause, troponins risking suspect type I    1/16: restart home meds, more awake today, upset over nursing home, cardiology recs revewied       I spent 35 minutes in the professional and overall care of this patient.      Lonnie Mahajan, DO

## 2024-01-16 NOTE — CONSULTS
Consults  History Of Present Illness:    Jacinta Castrejon is a 64 y.o. female presenting with CP, SOB.    Patient is a 64-year-old female with a history of COPD, DIONICIO on CPAP, diabetes, hypertension, hyperlipidemia, prior history of heart failure, morbid obesity, nursing home resident who presents with hypoxemia.  Patient was at her nursing home and states that she was roomed with somebody who had upper respiratory tract infection or COVID-19.  She developed chest discomfort and shortness of breath.  She has very little recollection of the events leading up to her hospitalization.  Upon arrival, patient's oxygen saturation was 70%.  She was placed on oxygen.  She came back flu a positive.  She admits to substernal chest tightness radiating to the back for the last 2 weeks.  These occur sporadically.  She states these are worse with movement.  She denies any palpitations, lightheadedness, syncope.  She does admit to some lower extremity edema.    Twelve-lead ECG demonstrates sinus tachycardia with left axis deviation.  BNP is 85.  Flu a is positive.  Troponin 49, 648 and 324.  Echocardiogram in August 2023 with an ejection fraction 60 to 65%.     Last Recorded Vitals:  Vitals:    01/15/24 0500 01/15/24 0700 01/15/24 1441 01/15/24 1600   BP: 109/65 105/63 106/62 124/67   Pulse: 105 98 91 85   Resp: 20 22 20 19   Temp:       SpO2: 97% 97% 100% 100%   Weight:       Height:           Last Labs:  CBC - 1/16/2024:  5:58 AM  10.7 10.9 227    34.3      CMP - 1/16/2024:  5:58 AM  9.1 7.9 16 --- 0.3   _ 3.6 11 50      PTT - 1/15/2024:  4:37 AM  1.1   12.7 34     Troponin I, High Sensitivity   Date/Time Value Ref Range Status   01/16/2024 06:02  (HH) 0 - 13 ng/L Final   01/15/2024 04:37  (HH) 0 - 13 ng/L Final     Comment:     Previous result verified on 1/15/2024 0204 on specimen/case [id number] called with component [facility] for procedure Troponin I, High Sensitivity with value 189 ng/L.   01/15/2024 01:04   (HH) 0 - 13 ng/L Final     BNP   Date/Time Value Ref Range Status   01/15/2024 12:08 AM 85 0 - 99 pg/mL Final   08/11/2023 04:10 PM 44 0 - 99 pg/mL Final     Comment:     .  <100 pg/mL - Heart failure unlikely  100-299 pg/mL - Intermediate probability of acute heart  .               failure exacerbation. Correlate with clinical  .               context and patient history.    >=300 pg/mL - Heart Failure likely. Correlate with clinical  .               context and patient history.  BNP testing is performed using different testing   methodology at [facility] than at other   system hospitals. Direct result comparisons should   only be made within the same method.     12/25/2022 06:47 PM 43 0 - 99 pg/mL Final     Comment:     .  <100 pg/mL - Heart failure unlikely  100-299 pg/mL - Intermediate probability of acute heart  .               failure exacerbation. Correlate with clinical  .               context and patient history.    >=300 pg/mL - Heart Failure likely. Correlate with clinical  .               context and patient history.  BNP testing is performed using different testing   methodology at [facility] than at other   [facility] hospitals. Direct result comparisons should   only be made within the same method.       Hemoglobin A1C   Date/Time Value Ref Range Status   08/12/2023 06:52 AM 7.3 (A) % Final     Comment:          Diagnosis of Diabetes-Adults   Non-Diabetic: < or = 5.6%   Increased risk for developing diabetes: 5.7-6.4%   Diagnostic of diabetes: > or = 6.5%  .       Monitoring of Diabetes                Age (y)     Therapeutic Goal (%)   Adults:          >18           <7.0   Pediatrics:    13-18           <7.5                   7-12           <8.0                   0- 6            7.5-8.5   American Diabetes Association. Diabetes Care 33(S1), Jan 2010.     12/26/2022 07:50 AM 8.1 (A) % Final     Comment:          Diagnosis of Diabetes-Adults   Non-Diabetic: < or = 5.6%    Increased risk for developing diabetes: 5.7-6.4%   Diagnostic of diabetes: > or = 6.5%  .       Monitoring of Diabetes                Age (y)     Therapeutic Goal (%)   Adults:          >18           <7.0   Pediatrics:    13-18           <7.5                   7-12           <8.0                   0- 6            7.5-8.5   American Diabetes Association. Diabetes Care 33(S1), Jan 2010.     10/13/2022 09:54 AM 13.0 (H) 4.3 - 5.6 % Final     Comment:     American Diabetes Association guidelines indicate that patients with HgbA1c in the range 5.7-6.4% are at increased risk for development of diabetes, and intervention by lifestyle modification may be beneficial. HgbA1c greater or equal to 6.5% is considered diagnostic of diabetes.   07/19/2022 06:21 AM 11.5 (H) 4.3 - 5.6 % Final     Comment:     American Diabetes Association guidelines indicate that patients with HgbA1c in the range 5.7-6.4% are at increased risk for development of diabetes, and intervention by lifestyle modification may be beneficial. HgbA1c greater or equal to 6.5% is considered diagnostic of diabetes.     POCT Hemoglobin A1C   Date/Time Value Ref Range Status   05/31/2023 03:10 PM 7.3 (A) 4.2 - 5.6 % Final     Comment:     Location:[facility], [address] [place] [address], [id number]  Point of care (POC) Hemoglobin A1c (HGBA1C) testing is intended to assess  glucose control and provide a management tool for patients known to have  diabetes and their healthcare providers.  Target HGBA1C levels may depend on  specific clinical circumstances.  POC HGBA1C is not intended for use as a  diagnostic or screening test; laboratory-based testing should be used for  diagnostic purposes.  The following information is supplemental and may not  be applicable to specific diabetes management situations:  The POC device   provides a normal range of 4.2% to 6.5% for the HGBA1C POC test.  However, the American Diabetes  "Association  guidelines indicate that  patients with HGBA1C in the range of 5.7% to 6.4% are at increased risk for  development of diabetes and that intervention by lifestyle modification may  be beneficial.  A HGBA1C level greater than or equal to 6.5% is considered  diagnostic of diabetes, pending confirmatory testing.  Use of HGBA1C testing  to evaluate glucose control may not be appropriate for patients with  hemoglobin variants or other conditions (e.g. anemia) that alter red blood  cell lifespan.     VLDL   Date/Time Value Ref Range Status   08/12/2023 06:52 AM 23 0 - 40 mg/dL Final   12/26/2022 07:50 AM 11 0 - 40 mg/dL Final      Last I/O:  No intake/output data recorded.    Past Cardiology Tests (Last 3 Years):  EKG:  ECG 12 lead 01/15/2024 (Preliminary)    Echo:  8/15/2023  Ejection fraction 60 to 65%.    Ejection Fractions:  No results found for: \"EF\"  Cath:  No results found for this or any previous visit from the past 1095 days.    Stress Test:  No results found for this or any previous visit from the past 1095 days.    Cardiac Imaging:  No results found for this or any previous visit from the past 1095 days.      Past Medical History:  She has no past medical history on file.    Past Surgical History:  She has no past surgical history on file.      Social History:  She has no history on file for tobacco use, alcohol use, and drug use.    Family History:  No family history on file.     Allergies:  Penicillins    Inpatient Medications:  Scheduled medications   Medication Dose Route Frequency    aspirin  81 mg oral Daily    atorvastatin  40 mg oral Nightly    guaiFENesin  600 mg oral BID    insulin lispro  0-10 Units subcutaneous TID with meals    methylPREDNISolone sodium succinate (PF)  40 mg intravenous q8h ISABELLE    polyethylene glycol  17 g oral Daily     PRN medications   Medication    acetaminophen    benzonatate    dextrose 10 % in water (D10W)    dextrose    glucagon    heparin    oxygen "     Continuous Medications   Medication Dose Last Rate    heparin  0-4,000 Units/hr 1,000 Units/hr (01/16/24 0610)     Outpatient Medications:  Current Outpatient Medications   Medication Instructions    acetaminophen (Tylenol) 500 mg tablet 2 tablets, oral, Every 8 hours PRN    albuterol 2.5 mg, inhalation, Every 6 hours PRN    ammonium lactate (Lac-Hydrin) 12 % lotion 1 Application, Topical, As needed, BLE    aspirin 81 mg chewable tablet 1 tablet, oral, Daily    atorvastatin (LIPITOR) 40 mg, oral, Nightly    bisacodyl (DULCOLAX) 10 mg, rectal, Daily PRN    budesonide (PULMICORT) 0.5 mg, inhalation, Every 12 hours    butalbital-aspirin-caffeine (Fiorinal) -40 mg capsule 1 capsule, oral, Every 6 hours PRN    calcium carbonate (Tums) 200 mg calcium chewable tablet 1 tablet, oral, Every 12 hours PRN    capsicum (Zostrix) 0.075 % topical cream 1 Application, Topical, 3 times daily, Bilateral knees/shoulders    chlorhexidine (Peridex) 0.12 % solution 15 mL, mucous membrane, Daily    cyclobenzaprine (Flexeril) 10 mg tablet 1 tablet, oral, Every 12 hours    diclofenac sodium 1 % kit 1 Application, Topical, 4 times daily    docusate sodium (COLACE) 100 mg, oral, 2 times daily, Hold for loose stools    DULoxetine (CYMBALTA) 60 mg, oral, Daily    ergocalciferol (VITAMIN D-2) 50,000 Units, oral, 2 times weekly, Every Monday and Thursday    famotidine (PEPCID) 20 mg, oral, Daily    formoterol (PERFOROMIST) 20 mcg, inhalation, Every 12 hours    furosemide (LASIX) 40 mg, oral, Daily    hydrocortisone 1 % cream 1 Application, Topical, Every 12 hours PRN    insulin glargine (LANTUS) 26 Units, subcutaneous, 2 times daily    insulin lispro (HumaLOG KwikPen Insulin) 100 unit/mL injection subcutaneous, 3 times daily before meals, Per sliding scale    insulin lispro (HUMALOG) 10 Units, subcutaneous, 3 times daily with meals    LORazepam (ATIVAN) 1 mg, oral, Every 8 hours PRN    mirabegron (Myrbetriq) 25 mg tablet extended  release 24 hr 24 hr tablet 1 tablet, oral, Daily    montelukast (SINGULAIR) 10 mg, oral, Nightly    neomycin-bacitracnZn-polymyxnB (Triple Antibiotic) ointment 1 Application, Topical, As needed, Under Bilateral Breasts    nitroglycerin (NITROSTAT) 0.4 mg, sublingual, Every 5 min PRN    omeprazole (PRILOSEC) 20 mg, oral, Daily before breakfast    oxyCODONE-acetaminophen (Percocet) 5-325 mg tablet 2 tablets, oral, Every 6 hours    polyethylene glycol (GLYCOLAX, MIRALAX) 17 g, oral, Daily    pregabalin (LYRICA) 200 mg, oral, 2 times daily    sennosides (Senokot) 8.6 mg tablet 1 tablet, oral, 2 times daily    spironolactone (ALDACTONE) 12.5 mg, oral, Daily       Physical Exam:  Physical Exam  Vitals reviewed.   Constitutional:       Appearance: Normal appearance.   HENT:      Head: Normocephalic and atraumatic.      Mouth/Throat:      Mouth: Mucous membranes are moist.      Pharynx: Oropharynx is clear.   Cardiovascular:      Rate and Rhythm: Normal rate and regular rhythm.      Pulses: Normal pulses.      Heart sounds: Normal heart sounds.   Pulmonary:      Effort: Pulmonary effort is normal.      Breath sounds: Wheezing present.   Abdominal:      General: Bowel sounds are normal.      Palpations: Abdomen is soft.   Musculoskeletal:      Cervical back: Neck supple.   Skin:     General: Skin is warm and dry.   Neurological:      General: No focal deficit present.      Mental Status: She is alert.   Psychiatric:         Mood and Affect: Mood normal.         Behavior: Behavior normal.           Assessment/Plan   1/16/2024  1.  NSTEMI: Patient with elevation in troponins relatively quickly and decrease.  Uncertain if this is related to underlying coronary artery disease or stress related to her flu a infection.  Will go ahead and repeat an echocardiogram to evaluate LV function.  Given her 2 weeks of discomforts and chest tightness, may warrant further evaluation.  Will go ahead and restart her aspirin and atorvastatin.   Would continue heparin drip for now.  Given her respiratory distress, will hold off on beta-blockade.  2.  History of HFpEF: Appears fairly euvolemic at this time.  Will hold off on additional diuretic.  Will reassess daily.  3.  Hypertension: Currently not an issue.  4.  Hyperlipidemia: Continue statin therapy.  5.  Further recommendations to follow.       Code Status:  Full Code    Chaitanya Adrian MD

## 2024-01-17 ENCOUNTER — APPOINTMENT (OUTPATIENT)
Dept: CARDIOLOGY | Facility: HOSPITAL | Age: 64
End: 2024-01-17
Payer: MEDICAID

## 2024-01-17 PROBLEM — I21.4 NSTEMI (NON-ST ELEVATED MYOCARDIAL INFARCTION) (MULTI): Status: ACTIVE | Noted: 2024-01-14

## 2024-01-17 LAB
ANION GAP SERPL CALC-SCNC: 11 MMOL/L (ref 10–20)
AORTIC VALVE PEAK VELOCITY: 1.28
AV PEAK GRADIENT: 6.6
AVA (PEAK VEL): 2.32
BUN SERPL-MCNC: 20 MG/DL (ref 6–23)
CALCIUM SERPL-MCNC: 8.5 MG/DL (ref 8.6–10.3)
CHLORIDE SERPL-SCNC: 96 MMOL/L (ref 98–107)
CO2 SERPL-SCNC: 34 MMOL/L (ref 21–32)
CREAT SERPL-MCNC: 0.73 MG/DL (ref 0.5–1.05)
EGFRCR SERPLBLD CKD-EPI 2021: >90 ML/MIN/1.73M*2
EJECTION FRACTION APICAL 4 CHAMBER: 74.6
EJECTION FRACTION: 71
ERYTHROCYTE [DISTWIDTH] IN BLOOD BY AUTOMATED COUNT: 15.7 % (ref 11.5–14.5)
GLUCOSE BLD MANUAL STRIP-MCNC: 169 MG/DL (ref 74–99)
GLUCOSE BLD MANUAL STRIP-MCNC: 184 MG/DL (ref 74–99)
GLUCOSE BLD MANUAL STRIP-MCNC: 252 MG/DL (ref 74–99)
GLUCOSE BLD MANUAL STRIP-MCNC: 269 MG/DL (ref 74–99)
GLUCOSE SERPL-MCNC: 273 MG/DL (ref 74–99)
HCT VFR BLD AUTO: 35.6 % (ref 36–46)
HGB BLD-MCNC: 11 G/DL (ref 12–16)
LEFT VENTRICLE INTERNAL DIMENSION DIASTOLE: 5.17 (ref 3.5–6)
LEFT VENTRICULAR OUTFLOW TRACT DIAMETER: 2.2
MCH RBC QN AUTO: 26.8 PG (ref 26–34)
MCHC RBC AUTO-ENTMCNC: 30.9 G/DL (ref 32–36)
MCV RBC AUTO: 87 FL (ref 80–100)
MITRAL VALVE E/A RATIO: 1.72
NRBC BLD-RTO: 0 /100 WBCS (ref 0–0)
PLATELET # BLD AUTO: 227 X10*3/UL (ref 150–450)
POTASSIUM SERPL-SCNC: 3.9 MMOL/L (ref 3.5–5.3)
RBC # BLD AUTO: 4.11 X10*6/UL (ref 4–5.2)
SODIUM SERPL-SCNC: 137 MMOL/L (ref 136–145)
UFH PPP CHRO-ACNC: 0.2 IU/ML
UFH PPP CHRO-ACNC: 0.2 IU/ML
WBC # BLD AUTO: 7 X10*3/UL (ref 4.4–11.3)

## 2024-01-17 PROCEDURE — 2500000001 HC RX 250 WO HCPCS SELF ADMINISTERED DRUGS (ALT 637 FOR MEDICARE OP): Performed by: INTERNAL MEDICINE

## 2024-01-17 PROCEDURE — 2500000005 HC RX 250 GENERAL PHARMACY W/O HCPCS: Performed by: INTERNAL MEDICINE

## 2024-01-17 PROCEDURE — C1894 INTRO/SHEATH, NON-LASER: HCPCS | Performed by: INTERNAL MEDICINE

## 2024-01-17 PROCEDURE — 99232 SBSQ HOSP IP/OBS MODERATE 35: CPT | Performed by: STUDENT IN AN ORGANIZED HEALTH CARE EDUCATION/TRAINING PROGRAM

## 2024-01-17 PROCEDURE — 85520 HEPARIN ASSAY: CPT | Performed by: STUDENT IN AN ORGANIZED HEALTH CARE EDUCATION/TRAINING PROGRAM

## 2024-01-17 PROCEDURE — B2111ZZ FLUOROSCOPY OF MULTIPLE CORONARY ARTERIES USING LOW OSMOLAR CONTRAST: ICD-10-PCS | Performed by: INTERNAL MEDICINE

## 2024-01-17 PROCEDURE — 2500000004 HC RX 250 GENERAL PHARMACY W/ HCPCS (ALT 636 FOR OP/ED): Performed by: STUDENT IN AN ORGANIZED HEALTH CARE EDUCATION/TRAINING PROGRAM

## 2024-01-17 PROCEDURE — 7100000010 HC PHASE TWO TIME - EACH INCREMENTAL 1 MINUTE: Performed by: INTERNAL MEDICINE

## 2024-01-17 PROCEDURE — 2500000002 HC RX 250 W HCPCS SELF ADMINISTERED DRUGS (ALT 637 FOR MEDICARE OP, ALT 636 FOR OP/ED): Performed by: STUDENT IN AN ORGANIZED HEALTH CARE EDUCATION/TRAINING PROGRAM

## 2024-01-17 PROCEDURE — 2780000003 HC OR 278 NO HCPCS: Performed by: INTERNAL MEDICINE

## 2024-01-17 PROCEDURE — 99221 1ST HOSP IP/OBS SF/LOW 40: CPT | Performed by: NURSE PRACTITIONER

## 2024-01-17 PROCEDURE — 2550000001 HC RX 255 CONTRASTS: Performed by: INTERNAL MEDICINE

## 2024-01-17 PROCEDURE — 94640 AIRWAY INHALATION TREATMENT: CPT

## 2024-01-17 PROCEDURE — 2720000007 HC OR 272 NO HCPCS: Performed by: INTERNAL MEDICINE

## 2024-01-17 PROCEDURE — 99152 MOD SED SAME PHYS/QHP 5/>YRS: CPT | Performed by: INTERNAL MEDICINE

## 2024-01-17 PROCEDURE — 2500000004 HC RX 250 GENERAL PHARMACY W/ HCPCS (ALT 636 FOR OP/ED): Performed by: INTERNAL MEDICINE

## 2024-01-17 PROCEDURE — 93458 L HRT ARTERY/VENTRICLE ANGIO: CPT | Performed by: INTERNAL MEDICINE

## 2024-01-17 PROCEDURE — 2500000001 HC RX 250 WO HCPCS SELF ADMINISTERED DRUGS (ALT 637 FOR MEDICARE OP): Performed by: NURSE PRACTITIONER

## 2024-01-17 PROCEDURE — 93306 TTE W/DOPPLER COMPLETE: CPT | Performed by: STUDENT IN AN ORGANIZED HEALTH CARE EDUCATION/TRAINING PROGRAM

## 2024-01-17 PROCEDURE — 93306 TTE W/DOPPLER COMPLETE: CPT

## 2024-01-17 PROCEDURE — 36415 COLL VENOUS BLD VENIPUNCTURE: CPT | Performed by: STUDENT IN AN ORGANIZED HEALTH CARE EDUCATION/TRAINING PROGRAM

## 2024-01-17 PROCEDURE — 2060000001 HC INTERMEDIATE ICU ROOM DAILY

## 2024-01-17 PROCEDURE — 2500000001 HC RX 250 WO HCPCS SELF ADMINISTERED DRUGS (ALT 637 FOR MEDICARE OP): Performed by: STUDENT IN AN ORGANIZED HEALTH CARE EDUCATION/TRAINING PROGRAM

## 2024-01-17 PROCEDURE — 99233 SBSQ HOSP IP/OBS HIGH 50: CPT | Performed by: INTERNAL MEDICINE

## 2024-01-17 PROCEDURE — 7100000009 HC PHASE TWO TIME - INITIAL BASE CHARGE: Performed by: INTERNAL MEDICINE

## 2024-01-17 PROCEDURE — 82947 ASSAY GLUCOSE BLOOD QUANT: CPT

## 2024-01-17 PROCEDURE — 2500000004 HC RX 250 GENERAL PHARMACY W/ HCPCS (ALT 636 FOR OP/ED): Performed by: NURSE PRACTITIONER

## 2024-01-17 PROCEDURE — 85027 COMPLETE CBC AUTOMATED: CPT

## 2024-01-17 PROCEDURE — 2500000002 HC RX 250 W HCPCS SELF ADMINISTERED DRUGS (ALT 637 FOR MEDICARE OP, ALT 636 FOR OP/ED): Performed by: NURSE PRACTITIONER

## 2024-01-17 PROCEDURE — 2500000004 HC RX 250 GENERAL PHARMACY W/ HCPCS (ALT 636 FOR OP/ED)

## 2024-01-17 PROCEDURE — 4A023N7 MEASUREMENT OF CARDIAC SAMPLING AND PRESSURE, LEFT HEART, PERCUTANEOUS APPROACH: ICD-10-PCS | Performed by: INTERNAL MEDICINE

## 2024-01-17 PROCEDURE — 80048 BASIC METABOLIC PNL TOTAL CA: CPT | Performed by: STUDENT IN AN ORGANIZED HEALTH CARE EDUCATION/TRAINING PROGRAM

## 2024-01-17 RX ORDER — LIDOCAINE HYDROCHLORIDE 20 MG/ML
INJECTION, SOLUTION INFILTRATION; PERINEURAL AS NEEDED
Status: DISCONTINUED | OUTPATIENT
Start: 2024-01-17 | End: 2024-01-17 | Stop reason: HOSPADM

## 2024-01-17 RX ORDER — HEPARIN SODIUM 1000 [USP'U]/ML
INJECTION, SOLUTION INTRAVENOUS; SUBCUTANEOUS AS NEEDED
Status: DISCONTINUED | OUTPATIENT
Start: 2024-01-17 | End: 2024-01-17 | Stop reason: HOSPADM

## 2024-01-17 RX ORDER — VERAPAMIL HYDROCHLORIDE 2.5 MG/ML
INJECTION, SOLUTION INTRAVENOUS AS NEEDED
Status: DISCONTINUED | OUTPATIENT
Start: 2024-01-17 | End: 2024-01-17 | Stop reason: HOSPADM

## 2024-01-17 RX ORDER — ATORVASTATIN CALCIUM 40 MG/1
40 TABLET, FILM COATED ORAL NIGHTLY
Status: DISCONTINUED | OUTPATIENT
Start: 2024-01-17 | End: 2024-01-17

## 2024-01-17 RX ORDER — SODIUM CHLORIDE 9 MG/ML
INJECTION, SOLUTION INTRAVENOUS CONTINUOUS PRN
Status: COMPLETED | OUTPATIENT
Start: 2024-01-17 | End: 2024-01-17

## 2024-01-17 RX ORDER — INSULIN GLARGINE 100 [IU]/ML
20 INJECTION, SOLUTION SUBCUTANEOUS EVERY 24 HOURS
Status: DISCONTINUED | OUTPATIENT
Start: 2024-01-17 | End: 2024-01-20

## 2024-01-17 RX ORDER — MIDAZOLAM HYDROCHLORIDE 1 MG/ML
INJECTION INTRAMUSCULAR; INTRAVENOUS AS NEEDED
Status: DISCONTINUED | OUTPATIENT
Start: 2024-01-17 | End: 2024-01-17 | Stop reason: HOSPADM

## 2024-01-17 RX ORDER — FENTANYL CITRATE 50 UG/ML
INJECTION, SOLUTION INTRAMUSCULAR; INTRAVENOUS AS NEEDED
Status: DISCONTINUED | OUTPATIENT
Start: 2024-01-17 | End: 2024-01-17 | Stop reason: HOSPADM

## 2024-01-17 RX ORDER — NAPROXEN SODIUM 220 MG/1
81 TABLET, FILM COATED ORAL DAILY
Status: DISCONTINUED | OUTPATIENT
Start: 2024-01-17 | End: 2024-01-17

## 2024-01-17 RX ADMIN — INSULIN GLARGINE 20 UNITS: 100 INJECTION, SOLUTION SUBCUTANEOUS at 07:59

## 2024-01-17 RX ADMIN — GUAIFENESIN 600 MG: 600 TABLET, EXTENDED RELEASE ORAL at 08:00

## 2024-01-17 RX ADMIN — ALBUTEROL SULFATE 2.5 MG: 2.5 SOLUTION RESPIRATORY (INHALATION) at 13:13

## 2024-01-17 RX ADMIN — METHYLPREDNISOLONE SODIUM SUCCINATE 40 MG: 40 INJECTION, POWDER, FOR SOLUTION INTRAMUSCULAR; INTRAVENOUS at 16:19

## 2024-01-17 RX ADMIN — METHYLPREDNISOLONE SODIUM SUCCINATE 40 MG: 40 INJECTION, POWDER, FOR SOLUTION INTRAMUSCULAR; INTRAVENOUS at 05:55

## 2024-01-17 RX ADMIN — INSULIN LISPRO 6 UNITS: 100 INJECTION, SOLUTION INTRAVENOUS; SUBCUTANEOUS at 11:46

## 2024-01-17 RX ADMIN — ALBUTEROL SULFATE 2.5 MG: 2.5 SOLUTION RESPIRATORY (INHALATION) at 06:37

## 2024-01-17 RX ADMIN — DULOXETINE HYDROCHLORIDE 60 MG: 30 CAPSULE, DELAYED RELEASE ORAL at 08:00

## 2024-01-17 RX ADMIN — PERFLUTREN 2 ML OF DILUTION: 6.52 INJECTION, SUSPENSION INTRAVENOUS at 10:02

## 2024-01-17 RX ADMIN — HEPARIN SODIUM 3000 UNITS: 5000 INJECTION INTRAVENOUS; SUBCUTANEOUS at 08:02

## 2024-01-17 RX ADMIN — FUROSEMIDE 40 MG: 40 TABLET ORAL at 08:00

## 2024-01-17 RX ADMIN — OXYCODONE HYDROCHLORIDE AND ACETAMINOPHEN 1 TABLET: 5; 325 TABLET ORAL at 20:42

## 2024-01-17 RX ADMIN — OXYCODONE HYDROCHLORIDE AND ACETAMINOPHEN 1 TABLET: 5; 325 TABLET ORAL at 08:00

## 2024-01-17 RX ADMIN — ATORVASTATIN CALCIUM 40 MG: 40 TABLET, FILM COATED ORAL at 20:43

## 2024-01-17 RX ADMIN — METHYLPREDNISOLONE SODIUM SUCCINATE 40 MG: 40 INJECTION, POWDER, FOR SOLUTION INTRAMUSCULAR; INTRAVENOUS at 20:42

## 2024-01-17 RX ADMIN — CYCLOBENZAPRINE 10 MG: 10 TABLET, FILM COATED ORAL at 01:37

## 2024-01-17 RX ADMIN — PREGABALIN 200 MG: 75 CAPSULE ORAL at 20:41

## 2024-01-17 RX ADMIN — SENNOSIDES 8.6 MG: 8.6 TABLET, FILM COATED ORAL at 20:43

## 2024-01-17 RX ADMIN — FORMOTEROL FUMARATE DIHYDRATE 20 MCG: 20 SOLUTION RESPIRATORY (INHALATION) at 18:48

## 2024-01-17 RX ADMIN — SPIRONOLACTONE 12.5 MG: 25 TABLET, FILM COATED ORAL at 08:00

## 2024-01-17 RX ADMIN — HEPARIN SODIUM 1300 UNITS/HR: 10000 INJECTION, SOLUTION INTRAVENOUS at 03:57

## 2024-01-17 RX ADMIN — INSULIN LISPRO 6 UNITS: 100 INJECTION, SOLUTION INTRAVENOUS; SUBCUTANEOUS at 08:01

## 2024-01-17 RX ADMIN — BUDESONIDE 0.5 MG: 0.5 INHALANT ORAL at 18:47

## 2024-01-17 RX ADMIN — PANTOPRAZOLE SODIUM 40 MG: 40 TABLET, DELAYED RELEASE ORAL at 06:29

## 2024-01-17 RX ADMIN — FORMOTEROL FUMARATE DIHYDRATE 20 MCG: 20 SOLUTION RESPIRATORY (INHALATION) at 06:52

## 2024-01-17 RX ADMIN — BUDESONIDE 0.5 MG: 0.5 INHALANT ORAL at 06:37

## 2024-01-17 RX ADMIN — GUAIFENESIN 600 MG: 600 TABLET, EXTENDED RELEASE ORAL at 20:42

## 2024-01-17 RX ADMIN — POLYETHYLENE GLYCOL 3350 17 G: 17 POWDER, FOR SOLUTION ORAL at 08:01

## 2024-01-17 RX ADMIN — ALBUTEROL SULFATE 2.5 MG: 2.5 SOLUTION RESPIRATORY (INHALATION) at 18:47

## 2024-01-17 RX ADMIN — MONTELUKAST 10 MG: 10 TABLET, FILM COATED ORAL at 20:43

## 2024-01-17 RX ADMIN — INSULIN LISPRO 2 UNITS: 100 INJECTION, SOLUTION INTRAVENOUS; SUBCUTANEOUS at 16:22

## 2024-01-17 RX ADMIN — SENNOSIDES 8.6 MG: 8.6 TABLET, FILM COATED ORAL at 08:00

## 2024-01-17 RX ADMIN — PREGABALIN 200 MG: 75 CAPSULE ORAL at 08:00

## 2024-01-17 RX ADMIN — ASPIRIN 81 MG: 81 TABLET, CHEWABLE ORAL at 08:00

## 2024-01-17 RX ADMIN — FAMOTIDINE 20 MG: 20 TABLET ORAL at 08:00

## 2024-01-17 ASSESSMENT — COGNITIVE AND FUNCTIONAL STATUS - GENERAL
STANDING UP FROM CHAIR USING ARMS: A LOT
MOVING TO AND FROM BED TO CHAIR: A LOT
TURNING FROM BACK TO SIDE WHILE IN FLAT BAD: A LOT
DRESSING REGULAR LOWER BODY CLOTHING: A LOT
CLIMB 3 TO 5 STEPS WITH RAILING: TOTAL
TOILETING: A LITTLE
WALKING IN HOSPITAL ROOM: A LOT
STANDING UP FROM CHAIR USING ARMS: A LOT
MOBILITY SCORE: 11
CLIMB 3 TO 5 STEPS WITH RAILING: TOTAL
DAILY ACTIVITIY SCORE: 19
MOVING FROM LYING ON BACK TO SITTING ON SIDE OF FLAT BED WITH BEDRAILS: A LOT
MOVING FROM LYING ON BACK TO SITTING ON SIDE OF FLAT BED WITH BEDRAILS: A LOT
WALKING IN HOSPITAL ROOM: A LOT
HELP NEEDED FOR BATHING: A LITTLE
MOVING TO AND FROM BED TO CHAIR: A LOT
HELP NEEDED FOR BATHING: A LITTLE
TURNING FROM BACK TO SIDE WHILE IN FLAT BAD: A LOT
MOBILITY SCORE: 11
TOILETING: A LITTLE
DRESSING REGULAR LOWER BODY CLOTHING: A LOT
DRESSING REGULAR UPPER BODY CLOTHING: A LITTLE
DRESSING REGULAR UPPER BODY CLOTHING: A LITTLE
DAILY ACTIVITIY SCORE: 19

## 2024-01-17 ASSESSMENT — PAIN SCALES - GENERAL
PAINLEVEL_OUTOF10: 3
PAINLEVEL_OUTOF10: 0 - NO PAIN
PAINLEVEL_OUTOF10: 3
PAINLEVEL_OUTOF10: 10 - WORST POSSIBLE PAIN
PAINLEVEL_OUTOF10: 0 - NO PAIN

## 2024-01-17 ASSESSMENT — PAIN - FUNCTIONAL ASSESSMENT
PAIN_FUNCTIONAL_ASSESSMENT: 0-10

## 2024-01-17 NOTE — PROGRESS NOTES
"Called patient to assess discharge planning.  Introduced self and role.  Pt states \"why dont you come here and see me instead of calling?\"  Explained to patient that this [facility] is working remotely.  Pt hung up.      Called and spoke to patients primary nurse, introduced self and role, requested that she tell patient again purpose of call and transfer to room.  Primary nurse states she refuses the phone call and \"doesn't want to talk to anyone right now.\"    "

## 2024-01-17 NOTE — NURSING NOTE
TR band removed, clean dry dressing and splint applied. Site stable. No bleeding or hematoma. Report called to 8th floor RN.

## 2024-01-17 NOTE — PROGRESS NOTES
Subjective Data:  Patient continues to admit to substernal chest tightness.  Also mentions she has had left arm discomfort radiating to the left neck in the past.  Admits to shortness of breath.    Overnight Events:    No other events overnight.     Objective Data:  Last Recorded Vitals:  Vitals:    01/16/24 2356 01/17/24 0418 01/17/24 0637 01/17/24 0828   BP: 128/73 118/59  116/66   BP Location:       Patient Position:    Lying   Pulse: 82 82  81   Resp: 19 19  20   Temp: 35.2 °C (95.4 °F) 35.7 °C (96.3 °F)  36.4 °C (97.5 °F)   TempSrc:    Temporal   SpO2: 95% 100% 100% 98%   Weight:       Height:           Last Labs:  CBC - 1/17/2024:  6:00 AM  7.0 11.0 227    35.6      CMP - 1/17/2024:  6:00 AM  8.5 7.9 16 --- 0.3   _ 3.6 11 50      PTT - 1/15/2024:  4:37 AM  1.1   12.7 34     TROPHS   Date/Time Value Ref Range Status   01/16/2024 06:02  0 - 13 ng/L Final   01/15/2024 04:37  0 - 13 ng/L Final     Comment:     Previous result verified on 1/15/2024 0204 on specimen/case [id number] called with component [facility] for procedure Troponin I, High Sensitivity with value 189 ng/L.   01/15/2024 01:04  0 - 13 ng/L Final     BNP   Date/Time Value Ref Range Status   01/15/2024 12:08 AM 85 0 - 99 pg/mL Final   08/11/2023 04:10 PM 44 0 - 99 pg/mL Final     Comment:     .  <100 pg/mL - Heart failure unlikely  100-299 pg/mL - Intermediate probability of acute heart  .               failure exacerbation. Correlate with clinical  .               context and patient history.    >=300 pg/mL - Heart Failure likely. Correlate with clinical  .               context and patient history.  BNP testing is performed using different testing   methodology at [facility] than at other   [facility] hospitals. Direct result comparisons should   only be made within the same method.     12/25/2022 06:47 PM 43 0 - 99 pg/mL Final     Comment:     .  <100 pg/mL - Heart failure unlikely  100-299 pg/mL - Intermediate  probability of acute heart  .               failure exacerbation. Correlate with clinical  .               context and patient history.    >=300 pg/mL - Heart Failure likely. Correlate with clinical  .               context and patient history.  BNP testing is performed using different testing   methodology at [facility] than at other   [facility] hospitals. Direct result comparisons should   only be made within the same method.       HGBA1C   Date/Time Value Ref Range Status   08/12/2023 06:52 AM 7.3 % Final     Comment:          Diagnosis of Diabetes-Adults   Non-Diabetic: < or = 5.6%   Increased risk for developing diabetes: 5.7-6.4%   Diagnostic of diabetes: > or = 6.5%  .       Monitoring of Diabetes                Age (y)     Therapeutic Goal (%)   Adults:          >18           <7.0   Pediatrics:    13-18           <7.5                   7-12           <8.0                   0- 6            7.5-8.5   American Diabetes Association. Diabetes Care 33(S1), Jan 2010.     05/31/2023 03:10 PM 7.3 4.2 - 5.6 % Final     Comment:     Location:[facility], [address] [place] [address], [id number]  Point of care (POC) Hemoglobin A1c (HGBA1C) testing is intended to assess  glucose control and provide a management tool for patients known to have  diabetes and their healthcare providers.  Target HGBA1C levels may depend on  specific clinical circumstances.  POC HGBA1C is not intended for use as a  diagnostic or screening test; laboratory-based testing should be used for  diagnostic purposes.  The following information is supplemental and may not  be applicable to specific diabetes management situations:  The POC device   provides a normal range of 4.2% to 6.5% for the HGBA1C POC test.  However, the American Diabetes Association  guidelines indicate that  patients with HGBA1C in the range of 5.7% to 6.4% are at increased risk for  development of diabetes and that  intervention by lifestyle modification may  be beneficial.  A HGBA1C level greater than or equal to 6.5% is considered  diagnostic of diabetes, pending confirmatory testing.  Use of HGBA1C testing  to evaluate glucose control may not be appropriate for patients with  hemoglobin variants or other conditions (e.g. anemia) that alter red blood  cell lifespan.   12/26/2022 07:50 AM 8.1 % Final     Comment:          Diagnosis of Diabetes-Adults   Non-Diabetic: < or = 5.6%   Increased risk for developing diabetes: 5.7-6.4%   Diagnostic of diabetes: > or = 6.5%  .       Monitoring of Diabetes                Age (y)     Therapeutic Goal (%)   Adults:          >18           <7.0   Pediatrics:    13-18           <7.5                   7-12           <8.0                   0- 6            7.5-8.5   American Diabetes Association. Diabetes Care 33(S1), Jan 2010.     10/13/2022 09:54 AM 13.0 4.3 - 5.6 % Final     Comment:     American Diabetes Association guidelines indicate that patients with HgbA1c in the range 5.7-6.4% are at increased risk for development of diabetes, and intervention by lifestyle modification may be beneficial. HgbA1c greater or equal to 6.5% is considered diagnostic of diabetes.   07/19/2022 06:21 AM 11.5 4.3 - 5.6 % Final     Comment:     American Diabetes Association guidelines indicate that patients with HgbA1c in the range 5.7-6.4% are at increased risk for development of diabetes, and intervention by lifestyle modification may be beneficial. HgbA1c greater or equal to 6.5% is considered diagnostic of diabetes.     VLDL   Date/Time Value Ref Range Status   08/12/2023 06:52 AM 23 0 - 40 mg/dL Final   12/26/2022 07:50 AM 11 0 - 40 mg/dL Final      Last I/O:  I/O last 3 completed shifts:  In: 432.6 (2.4 mL/kg) [I.V.:432.6 (2.4 mL/kg)]  Out: 1500 (8.5 mL/kg) [Urine:1500 (0.2 mL/kg/hr)]  Weight: 176.9 kg     Past Cardiology Tests (Last 3 Years):  EKG:  ECG 12 lead 01/15/2024 (Preliminary)    Echo:  No  "results found for this or any previous visit from the past 1095 days.    Ejection Fractions:  No results found for: \"EF\"  Cath:  No results found for this or any previous visit from the past 1095 days.    Stress Test:  No results found for this or any previous visit from the past 1095 days.    Cardiac Imaging:  No results found for this or any previous visit from the past 1095 days.      Inpatient Medications:  Scheduled medications   Medication Dose Route Frequency    albuterol  2.5 mg nebulization TID    aspirin  81 mg oral Daily    atorvastatin  40 mg oral Nightly    budesonide  0.5 mg nebulization BID    capsaicin   Topical TID    cyclobenzaprine  10 mg oral q12h    DULoxetine  60 mg oral Daily    ergocalciferol  50,000 Units oral Once per day on Mon Thu    famotidine  20 mg oral Daily    formoterol  20 mcg nebulization q12h    furosemide  40 mg oral Daily    guaiFENesin  600 mg oral BID    insulin glargine  20 Units subcutaneous q24h    insulin lispro  0-10 Units subcutaneous TID with meals    methylPREDNISolone sodium succinate (PF)  40 mg intravenous q8h ISABELLE    montelukast  10 mg oral Nightly    pantoprazole  40 mg oral Daily before breakfast    perflutren lipid microspheres  0.5-10 mL of dilution intravenous Once in imaging    polyethylene glycol  17 g oral Daily    pregabalin  200 mg oral BID    sennosides  1 tablet oral BID    spironolactone  12.5 mg oral Daily     PRN medications   Medication    acetaminophen    acetaminophen    albuterol    albuterol    benzonatate    bisacodyl    butalbital-acetaminophen-caff    calcium carbonate    dextrose 10 % in water (D10W)    dextrose    glucagon    heparin    LORazepam    oxyCODONE-acetaminophen    oxygen     Continuous Medications   Medication Dose Last Rate    heparin  0-4,000 Units/hr 1,600 Units/hr (01/17/24 0803)       Physical Exam:  Physical Exam  Vitals reviewed.   Constitutional:       Appearance: Normal appearance.   HENT:      Head: Normocephalic and " atraumatic.      Mouth/Throat:      Mouth: Mucous membranes are moist.      Pharynx: Oropharynx is clear.   Cardiovascular:      Rate and Rhythm: Normal rate and regular rhythm.      Pulses: Normal pulses.      Heart sounds: Normal heart sounds.   Pulmonary:      Effort: Pulmonary effort is normal.      Breath sounds: Wheezing present.   Abdominal:      General: Bowel sounds are normal.      Palpations: Abdomen is soft.   Musculoskeletal:      Cervical back: Neck supple.   Skin:     General: Skin is warm and dry.   Neurological:      General: No focal deficit present.      Mental Status: She is alert.   Psychiatric:         Mood and Affect: Mood normal.         Behavior: Behavior normal.           Assessment/Plan   1/16/2024  1.  NSTEMI: Patient with elevation in troponins relatively quickly and decrease.  Uncertain if this is related to underlying coronary artery disease or stress related to her flu a infection.  Will go ahead and repeat an echocardiogram to evaluate LV function.  Given her 2 weeks of discomforts and chest tightness, may warrant further evaluation.  Will go ahead and restart her aspirin and atorvastatin.  Would continue heparin drip for now.  Given her respiratory distress, will hold off on beta-blockade.  2.  History of HFpEF: Appears fairly euvolemic at this time.  Will hold off on additional diuretic.  Will reassess daily.  3.  Hypertension: Currently not an issue.  4.  Hyperlipidemia: Continue statin therapy.  5.  Further recommendations to follow.    1/17/2024  1.  NSTEMI: Echocardiogram is pending.  Given the patient's persistent symptoms, we will go ahead and pursue cardiac catheterization today.  Continue aspirin and heparin drip.  Continue atorvastatin.  2.  History of HFpEF: Lasix and Aldactone was restarted.  3.  Hypertension  4.  Hyperlipidemia    Peripheral IV 01/15/24 20 G Left Antecubital (Active)   Site Assessment Clean;Dry;Intact 01/17/24 0746   Dressing Status Clean;Dry 01/17/24  0746   Number of days: 2       Code Status:  Full Code    Chaitanya Adrian MD

## 2024-01-17 NOTE — PROGRESS NOTES
"Jacinta Castrejon is a 64 y.o. female on day 2 of admission presenting with Shortness of breath.    Subjective   Doing ok, wheezign still. On higher oxygen pending cardaic work up       Objective     Physical Exam  Constitutional:       Appearance: Normal appearance.   HENT:      Head: Normocephalic and atraumatic.      Right Ear: Tympanic membrane and ear canal normal.      Left Ear: Tympanic membrane and ear canal normal.      Mouth/Throat:      Mouth: Mucous membranes are moist.      Pharynx: Oropharynx is clear.   Eyes:      Extraocular Movements: Extraocular movements intact.      Conjunctiva/sclera: Conjunctivae normal.      Pupils: Pupils are equal, round, and reactive to light.   Cardiovascular:      Rate and Rhythm: Normal rate and regular rhythm.      Pulses: Normal pulses.      Heart sounds: Normal heart sounds.   Pulmonary:      Effort: Pulmonary effort is normal.      Breath sounds: Normal breath sounds.   Abdominal:      General: Abdomen is flat. Bowel sounds are normal.      Palpations: Abdomen is soft.   Musculoskeletal:         General: Normal range of motion.      Cervical back: Normal range of motion and neck supple.   Skin:     General: Skin is warm and dry.      Capillary Refill: Capillary refill takes 2 to 3 seconds.   Neurological:      General: No focal deficit present.      Mental Status: She is alert and oriented to person, place, and time. Mental status is at baseline.   Psychiatric:         Mood and Affect: Mood normal.         Behavior: Behavior normal.         Thought Content: Thought content normal.         Judgment: Judgment normal.         Last Recorded Vitals  Blood pressure 116/66, pulse 81, temperature 36.4 °C (97.5 °F), temperature source Temporal, resp. rate 20, height 1.676 m (5' 5.98\"), weight (!) 177 kg (390 lb), SpO2 98 %.  Intake/Output last 3 Shifts:  I/O last 3 completed shifts:  In: 432.6 (2.4 mL/kg) [I.V.:432.6 (2.4 mL/kg)]  Out: 1500 (8.5 mL/kg) [Urine:1500 (0.2 " mL/kg/hr)]  Weight: 176.9 kg     Relevant Results           This patient currently has cardiac telemetry ordered; if you would like to modify or discontinue the telemetry order, click here to go to the orders activity to modify/discontinue the order.                 Assessment/Plan   Principal Problem:    Shortness of breath  Active Problems:    NSTEMI (non-ST elevated myocardial infarction) (CMS/[facility])    64 year old female with a past medical history of COPD chronically on 4 L oxygen, DIONICIO with CPAP, diabetes, hypertension, hyperlipidemia, heart failure, morbid obesity, who presented to [facility] ED today via EMS from nursing facility with shortness of breath.  Per ED physician, “Facility was reporting that patient was 30% on room air although we do believe this is likely to be inaccurate as she is still mentating appropriately.  EMS reported that she was in the 70s when they arrived.  They placed her on nonrebreather and she did improve into the mid to low 90s.”   She reports that she has been short of breath for a few days now. Cardiology consulted. Continue heparin, steroids, nebulizers, and BiPAP as needed. Patient to be admitted to hospital for further medical management.     #COPD Exacerbation on 4L continuous oxygen at home  #Acute on chronic respiratory failure with hypoxia and hypercapnia and supplemental oxygen requirement  #Influenza A+  #Shortness of breath  #Hx of DIONICIO  #Morbid obesity  Admit to inpatient/telemetry to Dr. Mahajan  See imaging results above  Continue BiPAP as needed. Wean BiPAP as able  Titrate oxygen to maintain sats >90%  Nebulizers  Solu-medrol 40mg IVP every 8 hours  Incentive spirometer  Droplet Isolation  UA ordered  Repeat labs in AM     #Suspect NSTEMI  #Elevated troponin   Consult cardiology and appreciate recs  Echocardiogram 8/15/23: EF 60-65%  NPO until cleared by cardiology  Aspirin ordered  Troponin 49, 189. Will trend.  Initiate heparin infusion     #Acute on chronic anemia  H&H  10.7/34.9  Continue to monitor     Chronic issues  #DMII  #HTN  #HLD  #CHF  Continue home meds as appropriate when nurse completes home medication reconciliation  SSI with hypoglycemic protocol  Full code  Smoking cessation  CPAP     #DVT prophylaxis  Heparin infusion  SCD's     1/15: resting off bipap, doing ok has fluenza which is likely cause, troponins risking suspect type I    1/16: restart home meds, more awake today, upset over nursing home, cardiology recs revewied    1/17: no issues will contineu treatments, pending heart work up       I spent 35 minutes in the professional and overall care of this patient.      Lonnie Mahajan, DO

## 2024-01-17 NOTE — H&P
History and Physical   Pre Surgical Review (< 30 days)      History & Physical Reviewed    I have reviewed the History and Physical dated:  1/15/24   History and Physical reviewed and relevant findings noted. Patient examined to review pertinent physical  findings.: No significant changes   Home Medications Reviewed: see EMR.   Allergies Reviewed: no changes noted      Home Medications  Current Outpatient Medications   Medication Instructions    acetaminophen (Tylenol) 500 mg tablet 2 tablets, oral, Every 8 hours PRN    albuterol 2.5 mg, inhalation, Every 6 hours PRN    ammonium lactate (Lac-Hydrin) 12 % lotion 1 Application, Topical, As needed, BLE    aspirin 81 mg chewable tablet 1 tablet, oral, Daily    atorvastatin (LIPITOR) 40 mg, oral, Nightly    bisacodyl (DULCOLAX) 10 mg, rectal, Daily PRN    budesonide (PULMICORT) 0.5 mg, inhalation, Every 12 hours    butalbital-aspirin-caffeine (Fiorinal) -40 mg capsule 1 capsule, oral, Every 6 hours PRN    calcium carbonate (Tums) 200 mg calcium chewable tablet 1 tablet, oral, Every 12 hours PRN    capsicum (Zostrix) 0.075 % topical cream 1 Application, Topical, 3 times daily, Bilateral knees/shoulders    chlorhexidine (Peridex) 0.12 % solution 15 mL, mucous membrane, Daily    cyclobenzaprine (Flexeril) 10 mg tablet 1 tablet, oral, Every 12 hours    diclofenac sodium 1 % kit 1 Application, Topical, 4 times daily    docusate sodium (COLACE) 100 mg, oral, 2 times daily, Hold for loose stools    DULoxetine (CYMBALTA) 60 mg, oral, Daily    ergocalciferol (VITAMIN D-2) 50,000 Units, oral, 2 times weekly, Every Monday and Thursday    famotidine (PEPCID) 20 mg, oral, Daily    formoterol (PERFOROMIST) 20 mcg, inhalation, Every 12 hours    furosemide (LASIX) 40 mg, oral, Daily    hydrocortisone 1 % cream 1 Application, Topical, Every 12 hours PRN    insulin glargine (LANTUS) 26 Units, subcutaneous, 2 times daily    insulin lispro (HumaLOG KwikPen Insulin) 100  unit/mL injection subcutaneous, 3 times daily before meals, Per sliding scale    insulin lispro (HUMALOG) 10 Units, subcutaneous, 3 times daily with meals    LORazepam (ATIVAN) 1 mg, oral, Every 8 hours PRN    mirabegron (Myrbetriq) 25 mg tablet extended release 24 hr 24 hr tablet 1 tablet, oral, Daily    montelukast (SINGULAIR) 10 mg, oral, Nightly    neomycin-bacitracnZn-polymyxnB (Triple Antibiotic) ointment 1 Application, Topical, As needed, Under Bilateral Breasts    nitroglycerin (NITROSTAT) 0.4 mg, sublingual, Every 5 min PRN    omeprazole (PRILOSEC) 20 mg, oral, Daily before breakfast    oxyCODONE-acetaminophen (Percocet) 5-325 mg tablet 2 tablets, oral, Every 6 hours    polyethylene glycol (GLYCOLAX, MIRALAX) 17 g, oral, Daily    pregabalin (LYRICA) 200 mg, oral, 2 times daily    sennosides (Senokot) 8.6 mg tablet 1 tablet, oral, 2 times daily    spironolactone (ALDACTONE) 12.5 mg, oral, Daily     Allergies  Allergies   Allergen Reactions    Penicillins Anaphylaxis and Hives     Physical Exam  Physical Exam  Constitutional:       General: She is not in acute distress.     Appearance: She is obese.   Cardiovascular:      Rate and Rhythm: Normal rate and regular rhythm.   Pulmonary:      Comments: Diminished bilaterally with expiratory wheezes.  Abdominal:      General: Bowel sounds are normal.   Neurological:      General: No focal deficit present.      Mental Status: She is alert and oriented to person, place, and time.   Psychiatric:         Mood and Affect: Mood normal.         Behavior: Behavior normal.        Airway/Sedation Assessment     Assessment by anesthesia N/A     ·  Mouth Opening OK yes      Neck Flexibility OK yes      Loose Teeth No   ·  Oropharyngeal Classification Grade III   ·  ASA PS Classification ASA III - Patient with severe systemic disease       Sedation Plan Moderate     Risks, benefits, and alternatives discussed with patient.     ERAS (Enhanced Recovery After Surgery):  ·  ERAS  Patient: No      Consent:   COVID-19 Consent:  ·  COVID-19 Risk Consent Surgeon has reviewed key risks related to the risk of roger COVID-19 and if they contract COVID-19 what the risks are.     Nandini Smalls, SUNNY-CNP

## 2024-01-18 LAB
ANION GAP SERPL CALC-SCNC: 12 MMOL/L (ref 10–20)
BUN SERPL-MCNC: 18 MG/DL (ref 6–23)
CALCIUM SERPL-MCNC: 8.2 MG/DL (ref 8.6–10.3)
CHLORIDE SERPL-SCNC: 99 MMOL/L (ref 98–107)
CHOLEST SERPL-MCNC: 97 MG/DL (ref 0–199)
CHOLESTEROL/HDL RATIO: 3
CO2 SERPL-SCNC: 33 MMOL/L (ref 21–32)
CREAT SERPL-MCNC: 0.73 MG/DL (ref 0.5–1.05)
EGFRCR SERPLBLD CKD-EPI 2021: >90 ML/MIN/1.73M*2
ERYTHROCYTE [DISTWIDTH] IN BLOOD BY AUTOMATED COUNT: 15.5 % (ref 11.5–14.5)
GLUCOSE BLD MANUAL STRIP-MCNC: 239 MG/DL (ref 74–99)
GLUCOSE BLD MANUAL STRIP-MCNC: 276 MG/DL (ref 74–99)
GLUCOSE BLD MANUAL STRIP-MCNC: 281 MG/DL (ref 74–99)
GLUCOSE BLD MANUAL STRIP-MCNC: 374 MG/DL (ref 74–99)
GLUCOSE SERPL-MCNC: 307 MG/DL (ref 74–99)
HCT VFR BLD AUTO: 38.5 % (ref 36–46)
HDLC SERPL-MCNC: 32.3 MG/DL
HGB BLD-MCNC: 11.5 G/DL (ref 12–16)
LDLC SERPL CALC-MCNC: 37 MG/DL
MCH RBC QN AUTO: 26.1 PG (ref 26–34)
MCHC RBC AUTO-ENTMCNC: 29.9 G/DL (ref 32–36)
MCV RBC AUTO: 87 FL (ref 80–100)
NON HDL CHOLESTEROL: 65 MG/DL (ref 0–149)
NRBC BLD-RTO: 0 /100 WBCS (ref 0–0)
PLATELET # BLD AUTO: 223 X10*3/UL (ref 150–450)
POTASSIUM SERPL-SCNC: 4.5 MMOL/L (ref 3.5–5.3)
RBC # BLD AUTO: 4.41 X10*6/UL (ref 4–5.2)
SODIUM SERPL-SCNC: 139 MMOL/L (ref 136–145)
TRIGL SERPL-MCNC: 137 MG/DL (ref 0–149)
VLDL: 27 MG/DL (ref 0–40)
WBC # BLD AUTO: 6.1 X10*3/UL (ref 4.4–11.3)

## 2024-01-18 PROCEDURE — 2060000001 HC INTERMEDIATE ICU ROOM DAILY

## 2024-01-18 PROCEDURE — 2500000002 HC RX 250 W HCPCS SELF ADMINISTERED DRUGS (ALT 637 FOR MEDICARE OP, ALT 636 FOR OP/ED): Performed by: NURSE PRACTITIONER

## 2024-01-18 PROCEDURE — 94640 AIRWAY INHALATION TREATMENT: CPT

## 2024-01-18 PROCEDURE — 80048 BASIC METABOLIC PNL TOTAL CA: CPT | Performed by: NURSE PRACTITIONER

## 2024-01-18 PROCEDURE — 85027 COMPLETE CBC AUTOMATED: CPT | Performed by: NURSE PRACTITIONER

## 2024-01-18 PROCEDURE — 2500000004 HC RX 250 GENERAL PHARMACY W/ HCPCS (ALT 636 FOR OP/ED): Performed by: NURSE PRACTITIONER

## 2024-01-18 PROCEDURE — 36415 COLL VENOUS BLD VENIPUNCTURE: CPT | Performed by: NURSE PRACTITIONER

## 2024-01-18 PROCEDURE — 2500000001 HC RX 250 WO HCPCS SELF ADMINISTERED DRUGS (ALT 637 FOR MEDICARE OP): Performed by: NURSE PRACTITIONER

## 2024-01-18 PROCEDURE — 82947 ASSAY GLUCOSE BLOOD QUANT: CPT

## 2024-01-18 PROCEDURE — 2500000002 HC RX 250 W HCPCS SELF ADMINISTERED DRUGS (ALT 637 FOR MEDICARE OP, ALT 636 FOR OP/ED): Performed by: STUDENT IN AN ORGANIZED HEALTH CARE EDUCATION/TRAINING PROGRAM

## 2024-01-18 PROCEDURE — 80061 LIPID PANEL: CPT | Performed by: NURSE PRACTITIONER

## 2024-01-18 RX ORDER — GUAIFENESIN 600 MG/1
600 TABLET, EXTENDED RELEASE ORAL 2 TIMES DAILY PRN
Status: DISCONTINUED | OUTPATIENT
Start: 2024-01-18 | End: 2024-01-24 | Stop reason: HOSPADM

## 2024-01-18 RX ORDER — INSULIN LISPRO 100 [IU]/ML
10 INJECTION, SOLUTION INTRAVENOUS; SUBCUTANEOUS ONCE
Status: COMPLETED | OUTPATIENT
Start: 2024-01-18 | End: 2024-01-18

## 2024-01-18 RX ADMIN — GUAIFENESIN 600 MG: 600 TABLET, EXTENDED RELEASE ORAL at 22:23

## 2024-01-18 RX ADMIN — CAPSAICIN: 0.25 CREAM TOPICAL at 14:04

## 2024-01-18 RX ADMIN — ALBUTEROL SULFATE 2.5 MG: 2.5 SOLUTION RESPIRATORY (INHALATION) at 14:00

## 2024-01-18 RX ADMIN — INSULIN LISPRO 6 UNITS: 100 INJECTION, SOLUTION INTRAVENOUS; SUBCUTANEOUS at 12:19

## 2024-01-18 RX ADMIN — DULOXETINE HYDROCHLORIDE 60 MG: 30 CAPSULE, DELAYED RELEASE ORAL at 09:33

## 2024-01-18 RX ADMIN — FUROSEMIDE 40 MG: 40 TABLET ORAL at 09:33

## 2024-01-18 RX ADMIN — CYCLOBENZAPRINE 10 MG: 10 TABLET, FILM COATED ORAL at 12:31

## 2024-01-18 RX ADMIN — ALBUTEROL SULFATE 2.5 MG: 2.5 SOLUTION RESPIRATORY (INHALATION) at 05:00

## 2024-01-18 RX ADMIN — METHYLPREDNISOLONE SODIUM SUCCINATE 40 MG: 40 INJECTION, POWDER, FOR SOLUTION INTRAMUSCULAR; INTRAVENOUS at 22:21

## 2024-01-18 RX ADMIN — INSULIN GLARGINE 20 UNITS: 100 INJECTION, SOLUTION SUBCUTANEOUS at 08:34

## 2024-01-18 RX ADMIN — CYCLOBENZAPRINE 10 MG: 10 TABLET, FILM COATED ORAL at 00:19

## 2024-01-18 RX ADMIN — PREGABALIN 200 MG: 75 CAPSULE ORAL at 22:24

## 2024-01-18 RX ADMIN — FORMOTEROL FUMARATE DIHYDRATE 20 MCG: 20 SOLUTION RESPIRATORY (INHALATION) at 19:23

## 2024-01-18 RX ADMIN — PANTOPRAZOLE SODIUM 40 MG: 40 TABLET, DELAYED RELEASE ORAL at 05:22

## 2024-01-18 RX ADMIN — BUDESONIDE 0.5 MG: 0.5 INHALANT ORAL at 05:00

## 2024-01-18 RX ADMIN — INSULIN LISPRO 4 UNITS: 100 INJECTION, SOLUTION INTRAVENOUS; SUBCUTANEOUS at 17:33

## 2024-01-18 RX ADMIN — METHYLPREDNISOLONE SODIUM SUCCINATE 40 MG: 40 INJECTION, POWDER, FOR SOLUTION INTRAMUSCULAR; INTRAVENOUS at 13:57

## 2024-01-18 RX ADMIN — MONTELUKAST 10 MG: 10 TABLET, FILM COATED ORAL at 22:22

## 2024-01-18 RX ADMIN — GUAIFENESIN 600 MG: 600 TABLET, EXTENDED RELEASE ORAL at 09:33

## 2024-01-18 RX ADMIN — ASPIRIN 81 MG: 81 TABLET, CHEWABLE ORAL at 09:32

## 2024-01-18 RX ADMIN — SENNOSIDES 8.6 MG: 8.6 TABLET, FILM COATED ORAL at 09:32

## 2024-01-18 RX ADMIN — ATORVASTATIN CALCIUM 40 MG: 40 TABLET, FILM COATED ORAL at 22:22

## 2024-01-18 RX ADMIN — INSULIN LISPRO 6 UNITS: 100 INJECTION, SOLUTION INTRAVENOUS; SUBCUTANEOUS at 08:37

## 2024-01-18 RX ADMIN — BUDESONIDE 0.5 MG: 0.5 INHALANT ORAL at 19:04

## 2024-01-18 RX ADMIN — ALBUTEROL SULFATE 2.5 MG: 2.5 SOLUTION RESPIRATORY (INHALATION) at 19:03

## 2024-01-18 RX ADMIN — SENNOSIDES 8.6 MG: 8.6 TABLET, FILM COATED ORAL at 22:23

## 2024-01-18 RX ADMIN — FORMOTEROL FUMARATE DIHYDRATE 20 MCG: 20 SOLUTION RESPIRATORY (INHALATION) at 05:01

## 2024-01-18 RX ADMIN — INSULIN LISPRO 10 UNITS: 100 INJECTION, SOLUTION INTRAVENOUS; SUBCUTANEOUS at 22:28

## 2024-01-18 RX ADMIN — PREGABALIN 200 MG: 75 CAPSULE ORAL at 09:32

## 2024-01-18 RX ADMIN — ERGOCALCIFEROL 50000 UNITS: 1.25 CAPSULE ORAL at 09:33

## 2024-01-18 RX ADMIN — POLYETHYLENE GLYCOL 3350 17 G: 17 POWDER, FOR SOLUTION ORAL at 09:33

## 2024-01-18 RX ADMIN — CAPSAICIN: 0.25 CREAM TOPICAL at 23:03

## 2024-01-18 RX ADMIN — METHYLPREDNISOLONE SODIUM SUCCINATE 40 MG: 40 INJECTION, POWDER, FOR SOLUTION INTRAMUSCULAR; INTRAVENOUS at 05:21

## 2024-01-18 RX ADMIN — SPIRONOLACTONE 12.5 MG: 25 TABLET, FILM COATED ORAL at 09:33

## 2024-01-18 RX ADMIN — FAMOTIDINE 20 MG: 20 TABLET ORAL at 09:32

## 2024-01-18 ASSESSMENT — COGNITIVE AND FUNCTIONAL STATUS - GENERAL
MOVING TO AND FROM BED TO CHAIR: A LOT
STANDING UP FROM CHAIR USING ARMS: A LOT
HELP NEEDED FOR BATHING: A LITTLE
TOILETING: A LITTLE
WALKING IN HOSPITAL ROOM: A LOT
DAILY ACTIVITIY SCORE: 18
MOBILITY SCORE: 11
DRESSING REGULAR UPPER BODY CLOTHING: A LITTLE
CLIMB 3 TO 5 STEPS WITH RAILING: TOTAL
TURNING FROM BACK TO SIDE WHILE IN FLAT BAD: A LOT
MOVING FROM LYING ON BACK TO SITTING ON SIDE OF FLAT BED WITH BEDRAILS: A LOT
DRESSING REGULAR LOWER BODY CLOTHING: TOTAL

## 2024-01-18 ASSESSMENT — PAIN - FUNCTIONAL ASSESSMENT
PAIN_FUNCTIONAL_ASSESSMENT: 0-10
PAIN_FUNCTIONAL_ASSESSMENT: 0-10

## 2024-01-18 ASSESSMENT — PAIN SCALES - GENERAL
PAINLEVEL_OUTOF10: 0 - NO PAIN
PAINLEVEL_OUTOF10: 0 - NO PAIN

## 2024-01-18 NOTE — CARE PLAN
Problem: Pain  Goal: My pain/discomfort is manageable  Outcome: Progressing     Problem: Safety  Goal: Patient will be injury free during hospitalization  Outcome: Progressing  Goal: I will remain free of falls  Outcome: Progressing     Problem: Daily Care  Goal: Daily care needs are met  Outcome: Progressing     Problem: Psychosocial Needs  Goal: Demonstrates ability to cope with hospitalization/illness  Outcome: Progressing  Goal: Collaborate with me, my family, and caregiver to identify my specific goals  Outcome: Progressing     Problem: Discharge Barriers  Goal: My discharge needs are met  Outcome: Progressing     Problem: Pain - Adult  Goal: Verbalizes/displays adequate comfort level or baseline comfort level  Outcome: Progressing     Problem: Safety - Adult  Goal: Free from fall injury  Outcome: Progressing     Problem: Discharge Planning  Goal: Discharge to home or other facility with appropriate resources  Outcome: Progressing     Problem: Chronic Conditions and Co-morbidities  Goal: Patient's chronic conditions and co-morbidity symptoms are monitored and maintained or improved  Outcome: Progressing     Problem: Skin  Goal: Decreased wound size/increased tissue granulation at next dressing change  1/18/2024 1352 by Dalia Pappas RN  Outcome: Progressing  Flowsheets (Taken 1/18/2024 1352)  Decreased wound size/increased tissue granulation at next dressing change:   Protective dressings over bony prominences   Promote sleep for wound healing   Utilize specialty bed per algorithm  1/18/2024 1345 by Dalia Pappas RN  Outcome: Progressing  Goal: Participates in plan/prevention/treatment measures  1/18/2024 1352 by Dalia Pappas RN  Outcome: Progressing  Flowsheets (Taken 1/17/2024 1115 by Luz Maria Jerome RN)  Participates in plan/prevention/treatment measures: Elevate heels  1/18/2024 1345 by Dalia Pappas RN  Outcome: Progressing  Goal: Prevent/manage excess moisture  1/18/2024 1352 by Dalia  VELMA Pappas  Outcome: Progressing  Flowsheets (Taken 1/18/2024 1352)  Prevent/manage excess moisture:   Cleanse incontinence/protect with barrier cream   Monitor for/manage infection if present   Follow provider orders for dressing changes   Use wicking fabric (obtain order)   Moisturize dry skin  1/18/2024 1345 by Dalia Pappas RN  Outcome: Progressing  Goal: Prevent/minimize sheer/friction injuries  1/18/2024 1352 by Dalia Pappas RN  Outcome: Progressing  Flowsheets (Taken 1/18/2024 1352)  Prevent/minimize sheer/friction injuries:   Complete micro-shifts as needed if patient unable. Adjust patient position to relieve pressure points, not a full turn   Increase activity/out of bed for meals   Use pull sheet   HOB 30 degrees or less   Turn/reposition every 2 hours/use positioning/transfer devices   Utilize specialty bed per algorithm  1/18/2024 1345 by Dalia Pappas RN  Outcome: Progressing  Goal: Promote/optimize nutrition  1/18/2024 1352 by Dalia Pappas RN  Outcome: Progressing  Flowsheets (Taken 1/18/2024 1352)  Promote/optimize nutrition:   Discuss with provider if NPO > 2 days   Reassess MST if dietician not consulted   Consume > 50% meals/supplements   Offer water/supplements/favorite foods   Assist with feeding   Monitor/record intake including meals  1/18/2024 1345 by Dalia Pappas RN  Outcome: Progressing  Goal: Promote skin healing  1/18/2024 1352 by Dalia Pappas RN  Outcome: Progressing  Flowsheets (Taken 1/18/2024 1352)  Promote skin healing:   Assess skin/pad under line(s)/device(s)   Protective dressings over bony prominences   Turn/reposition every 2 hours/use positioning/transfer devices   Ensure correct size (line/device) and apply per  instructions   Rotate device position/do not position patient on device  1/18/2024 1345 by Dalia Pappas RN  Outcome: Progressing     Problem: Pain  Goal: Takes deep breaths with improved pain control throughout the shift  Outcome:  Progressing  Goal: Turns in bed with improved pain control throughout the shift  Outcome: Progressing  Goal: Walks with improved pain control throughout the shift  Outcome: Progressing  Goal: Performs ADL's with improved pain control throughout shift  Outcome: Progressing  Goal: Participates in PT with improved pain control throughout the shift  Outcome: Progressing  Goal: Free from opioid side effects throughout the shift  Outcome: Progressing  Goal: Free from acute confusion related to pain meds throughout the shift  Outcome: Progressing   The patient's goals for the shift include  rest and comfort    The clinical goals for the shift include wear oxygen and arm board    Over the shift, the patient did not make progress toward the following goals. Barriers to progression include DX. Recommendations to address these barriers include follow POC.

## 2024-01-18 NOTE — CARE PLAN
Problem: Pain  Goal: My pain/discomfort is manageable  Outcome: Progressing     Problem: Safety  Goal: Patient will be injury free during hospitalization  Outcome: Progressing  Goal: I will remain free of falls  Outcome: Progressing     Problem: Daily Care  Goal: Daily care needs are met  Outcome: Progressing     Problem: Psychosocial Needs  Goal: Demonstrates ability to cope with hospitalization/illness  Outcome: Progressing  Goal: Collaborate with me, my family, and caregiver to identify my specific goals  Outcome: Progressing     Problem: Discharge Barriers  Goal: My discharge needs are met  Outcome: Progressing     Problem: Pain - Adult  Goal: Verbalizes/displays adequate comfort level or baseline comfort level  Outcome: Progressing     Problem: Safety - Adult  Goal: Free from fall injury  Outcome: Progressing     Problem: Discharge Planning  Goal: Discharge to home or other facility with appropriate resources  Outcome: Progressing     Problem: Chronic Conditions and Co-morbidities  Goal: Patient's chronic conditions and co-morbidity symptoms are monitored and maintained or improved  Outcome: Progressing     Problem: Skin  Goal: Decreased wound size/increased tissue granulation at next dressing change  Outcome: Progressing  Goal: Participates in plan/prevention/treatment measures  Outcome: Progressing  Goal: Prevent/manage excess moisture  Outcome: Progressing  Goal: Prevent/minimize sheer/friction injuries  Outcome: Progressing  Goal: Promote/optimize nutrition  Outcome: Progressing  Goal: Promote skin healing  Outcome: Progressing     Problem: Pain  Goal: Takes deep breaths with improved pain control throughout the shift  Outcome: Progressing  Goal: Turns in bed with improved pain control throughout the shift  Outcome: Progressing  Goal: Walks with improved pain control throughout the shift  Outcome: Progressing  Goal: Performs ADL's with improved pain control throughout shift  Outcome: Progressing  Goal:  Participates in PT with improved pain control throughout the shift  Outcome: Progressing  Goal: Free from opioid side effects throughout the shift  Outcome: Progressing  Goal: Free from acute confusion related to pain meds throughout the shift  Outcome: Progressing   The patient's goals for the shift include  rest and comfort    The clinical goals for the shift include wear oxygen and arm board    Over the shift, the patient did not make progress toward the following goals. Barriers to progression include Dx . Recommendations to address these barriers include follow POC.

## 2024-01-18 NOTE — PROGRESS NOTES
This TCC met with patient at bedside, introduced self and explained role.  Demographic information and insurance verified.  Patient states she lives at [facility]).  Patient is morbidly obese and needs assistance with ADLs, uses wheel chair for ambulation.  Denies SW needs at this time.  Patient anticipates returning to [facility] at discharge, and will need transportation arranged.  Referral sent to [facility] to confirm.  TCC will continue to follow care progression for discharge planning needs.     01/18/24 1003   Discharge Planning   Living Arrangements Alone;Other (Comment)  (From [facility])   Support Systems Family members   Assistance Needed Needs assistance with ADLs.  Uses wheel chair for ambulation.   Type of Residence Nursing home/residential care   Home or Post Acute Services Post acute facilities (Rehab/SNF/etc)   Type of Post Acute Facility Services Long term care   Patient expects to be discharged to: [facility]   Does the patient need discharge transport arranged? Yes   RoundTrip coordination needed? Yes   Has discharge transport been arranged? No     Sharlene Cardenas RN BSN, ED-TCC

## 2024-01-19 PROBLEM — R06.02 SHORTNESS OF BREATH: Status: RESOLVED | Noted: 2024-01-15 | Resolved: 2024-01-19

## 2024-01-19 PROBLEM — I21.4 NSTEMI (NON-ST ELEVATED MYOCARDIAL INFARCTION) (MULTI): Status: RESOLVED | Noted: 2024-01-14 | Resolved: 2024-01-19

## 2024-01-19 LAB
ERYTHROCYTE [DISTWIDTH] IN BLOOD BY AUTOMATED COUNT: 15.2 % (ref 11.5–14.5)
GLUCOSE BLD MANUAL STRIP-MCNC: 361 MG/DL (ref 74–99)
GLUCOSE BLD MANUAL STRIP-MCNC: 373 MG/DL (ref 74–99)
GLUCOSE BLD MANUAL STRIP-MCNC: 386 MG/DL (ref 74–99)
GLUCOSE BLD MANUAL STRIP-MCNC: 407 MG/DL (ref 74–99)
HCT VFR BLD AUTO: 38.1 % (ref 36–46)
HGB BLD-MCNC: 11.8 G/DL (ref 12–16)
HOLD SPECIMEN: NORMAL
MCH RBC QN AUTO: 26.6 PG (ref 26–34)
MCHC RBC AUTO-ENTMCNC: 31 G/DL (ref 32–36)
MCV RBC AUTO: 86 FL (ref 80–100)
NRBC BLD-RTO: 0 /100 WBCS (ref 0–0)
PLATELET # BLD AUTO: 251 X10*3/UL (ref 150–450)
RBC # BLD AUTO: 4.43 X10*6/UL (ref 4–5.2)
WBC # BLD AUTO: 7 X10*3/UL (ref 4.4–11.3)

## 2024-01-19 PROCEDURE — 2500000001 HC RX 250 WO HCPCS SELF ADMINISTERED DRUGS (ALT 637 FOR MEDICARE OP): Performed by: NURSE PRACTITIONER

## 2024-01-19 PROCEDURE — 82947 ASSAY GLUCOSE BLOOD QUANT: CPT

## 2024-01-19 PROCEDURE — 99232 SBSQ HOSP IP/OBS MODERATE 35: CPT | Performed by: INTERNAL MEDICINE

## 2024-01-19 PROCEDURE — 2060000001 HC INTERMEDIATE ICU ROOM DAILY

## 2024-01-19 PROCEDURE — 2500000004 HC RX 250 GENERAL PHARMACY W/ HCPCS (ALT 636 FOR OP/ED): Performed by: NURSE PRACTITIONER

## 2024-01-19 PROCEDURE — 85027 COMPLETE CBC AUTOMATED: CPT | Performed by: NURSE PRACTITIONER

## 2024-01-19 PROCEDURE — 36415 COLL VENOUS BLD VENIPUNCTURE: CPT | Performed by: NURSE PRACTITIONER

## 2024-01-19 PROCEDURE — 94640 AIRWAY INHALATION TREATMENT: CPT

## 2024-01-19 PROCEDURE — 2500000002 HC RX 250 W HCPCS SELF ADMINISTERED DRUGS (ALT 637 FOR MEDICARE OP, ALT 636 FOR OP/ED): Performed by: STUDENT IN AN ORGANIZED HEALTH CARE EDUCATION/TRAINING PROGRAM

## 2024-01-19 PROCEDURE — 99233 SBSQ HOSP IP/OBS HIGH 50: CPT | Performed by: INTERNAL MEDICINE

## 2024-01-19 PROCEDURE — 2500000002 HC RX 250 W HCPCS SELF ADMINISTERED DRUGS (ALT 637 FOR MEDICARE OP, ALT 636 FOR OP/ED): Performed by: NURSE PRACTITIONER

## 2024-01-19 RX ORDER — GUAIFENESIN 600 MG/1
600 TABLET, EXTENDED RELEASE ORAL 2 TIMES DAILY
Qty: 60 TABLET | Refills: 0 | Status: SHIPPED | OUTPATIENT
Start: 2024-01-19

## 2024-01-19 RX ORDER — PREDNISONE 20 MG/1
TABLET ORAL
Qty: 18 TABLET | Refills: 0 | Status: SHIPPED | OUTPATIENT
Start: 2024-01-19 | End: 2024-01-28

## 2024-01-19 RX ORDER — ALBUTEROL SULFATE 0.83 MG/ML
2.5 SOLUTION RESPIRATORY (INHALATION)
Status: DISCONTINUED | OUTPATIENT
Start: 2024-01-19 | End: 2024-01-24 | Stop reason: HOSPADM

## 2024-01-19 RX ORDER — INSULIN GLARGINE 100 [IU]/ML
29 INJECTION, SOLUTION SUBCUTANEOUS 2 TIMES DAILY
Qty: 3 ML | Refills: 12 | Status: SHIPPED | OUTPATIENT
Start: 2024-01-19

## 2024-01-19 RX ORDER — OXYCODONE AND ACETAMINOPHEN 5; 325 MG/1; MG/1
1 TABLET ORAL EVERY 6 HOURS PRN
Qty: 5 TABLET | Refills: 0
Start: 2024-01-19

## 2024-01-19 RX ORDER — BENZONATATE 100 MG/1
100 CAPSULE ORAL 3 TIMES DAILY PRN
Qty: 20 CAPSULE | Refills: 0 | Status: SHIPPED | OUTPATIENT
Start: 2024-01-19

## 2024-01-19 RX ORDER — INSULIN LISPRO 100 [IU]/ML
10 INJECTION, SOLUTION INTRAVENOUS; SUBCUTANEOUS ONCE
Status: COMPLETED | OUTPATIENT
Start: 2024-01-19 | End: 2024-01-19

## 2024-01-19 RX ADMIN — METHYLPREDNISOLONE SODIUM SUCCINATE 40 MG: 40 INJECTION, POWDER, FOR SOLUTION INTRAMUSCULAR; INTRAVENOUS at 21:20

## 2024-01-19 RX ADMIN — METHYLPREDNISOLONE SODIUM SUCCINATE 40 MG: 40 INJECTION, POWDER, FOR SOLUTION INTRAMUSCULAR; INTRAVENOUS at 05:16

## 2024-01-19 RX ADMIN — ASPIRIN 81 MG: 81 TABLET, CHEWABLE ORAL at 08:52

## 2024-01-19 RX ADMIN — CAPSAICIN: 0.25 CREAM TOPICAL at 08:52

## 2024-01-19 RX ADMIN — CAPSAICIN: 0.25 CREAM TOPICAL at 21:00

## 2024-01-19 RX ADMIN — CYCLOBENZAPRINE 10 MG: 10 TABLET, FILM COATED ORAL at 00:25

## 2024-01-19 RX ADMIN — DULOXETINE HYDROCHLORIDE 60 MG: 30 CAPSULE, DELAYED RELEASE ORAL at 08:51

## 2024-01-19 RX ADMIN — ATORVASTATIN CALCIUM 40 MG: 40 TABLET, FILM COATED ORAL at 21:20

## 2024-01-19 RX ADMIN — OXYCODONE HYDROCHLORIDE AND ACETAMINOPHEN 1 TABLET: 5; 325 TABLET ORAL at 17:02

## 2024-01-19 RX ADMIN — GUAIFENESIN 600 MG: 600 TABLET, EXTENDED RELEASE ORAL at 21:20

## 2024-01-19 RX ADMIN — FUROSEMIDE 40 MG: 40 TABLET ORAL at 08:52

## 2024-01-19 RX ADMIN — FORMOTEROL FUMARATE DIHYDRATE 20 MCG: 20 SOLUTION RESPIRATORY (INHALATION) at 06:55

## 2024-01-19 RX ADMIN — BUDESONIDE 0.5 MG: 0.5 INHALANT ORAL at 06:39

## 2024-01-19 RX ADMIN — SPIRONOLACTONE 12.5 MG: 25 TABLET, FILM COATED ORAL at 08:51

## 2024-01-19 RX ADMIN — INSULIN LISPRO 10 UNITS: 100 INJECTION, SOLUTION INTRAVENOUS; SUBCUTANEOUS at 16:54

## 2024-01-19 RX ADMIN — ALBUTEROL SULFATE 2.5 MG: 2.5 SOLUTION RESPIRATORY (INHALATION) at 06:39

## 2024-01-19 RX ADMIN — INSULIN LISPRO 10 UNITS: 100 INJECTION, SOLUTION INTRAVENOUS; SUBCUTANEOUS at 08:52

## 2024-01-19 RX ADMIN — BUDESONIDE 0.5 MG: 0.5 INHALANT ORAL at 18:50

## 2024-01-19 RX ADMIN — ALBUTEROL SULFATE 2.5 MG: 2.5 SOLUTION RESPIRATORY (INHALATION) at 18:45

## 2024-01-19 RX ADMIN — INSULIN LISPRO 10 UNITS: 100 INJECTION, SOLUTION INTRAVENOUS; SUBCUTANEOUS at 21:19

## 2024-01-19 RX ADMIN — GUAIFENESIN 600 MG: 600 TABLET, EXTENDED RELEASE ORAL at 08:51

## 2024-01-19 RX ADMIN — CAPSAICIN: 0.25 CREAM TOPICAL at 15:54

## 2024-01-19 RX ADMIN — INSULIN LISPRO 10 UNITS: 100 INJECTION, SOLUTION INTRAVENOUS; SUBCUTANEOUS at 12:25

## 2024-01-19 RX ADMIN — PANTOPRAZOLE SODIUM 40 MG: 40 TABLET, DELAYED RELEASE ORAL at 07:17

## 2024-01-19 RX ADMIN — METHYLPREDNISOLONE SODIUM SUCCINATE 40 MG: 40 INJECTION, POWDER, FOR SOLUTION INTRAMUSCULAR; INTRAVENOUS at 13:01

## 2024-01-19 RX ADMIN — PREGABALIN 200 MG: 75 CAPSULE ORAL at 08:51

## 2024-01-19 RX ADMIN — SENNOSIDES 8.6 MG: 8.6 TABLET, FILM COATED ORAL at 08:51

## 2024-01-19 RX ADMIN — POLYETHYLENE GLYCOL 3350 17 G: 17 POWDER, FOR SOLUTION ORAL at 08:51

## 2024-01-19 RX ADMIN — INSULIN GLARGINE 20 UNITS: 100 INJECTION, SOLUTION SUBCUTANEOUS at 08:00

## 2024-01-19 RX ADMIN — FORMOTEROL FUMARATE DIHYDRATE 20 MCG: 20 SOLUTION RESPIRATORY (INHALATION) at 18:50

## 2024-01-19 RX ADMIN — ALBUTEROL SULFATE 2.5 MG: 2.5 SOLUTION RESPIRATORY (INHALATION) at 12:25

## 2024-01-19 RX ADMIN — PREGABALIN 200 MG: 75 CAPSULE ORAL at 21:20

## 2024-01-19 RX ADMIN — MONTELUKAST 10 MG: 10 TABLET, FILM COATED ORAL at 21:20

## 2024-01-19 RX ADMIN — SENNOSIDES 8.6 MG: 8.6 TABLET, FILM COATED ORAL at 21:20

## 2024-01-19 RX ADMIN — FAMOTIDINE 20 MG: 20 TABLET ORAL at 08:52

## 2024-01-19 RX ADMIN — CYCLOBENZAPRINE 10 MG: 10 TABLET, FILM COATED ORAL at 13:01

## 2024-01-19 ASSESSMENT — COGNITIVE AND FUNCTIONAL STATUS - GENERAL
MOVING FROM LYING ON BACK TO SITTING ON SIDE OF FLAT BED WITH BEDRAILS: A LOT
CLIMB 3 TO 5 STEPS WITH RAILING: TOTAL
DAILY ACTIVITIY SCORE: 19
DRESSING REGULAR LOWER BODY CLOTHING: A LOT
DRESSING REGULAR LOWER BODY CLOTHING: A LOT
TURNING FROM BACK TO SIDE WHILE IN FLAT BAD: A LOT
WALKING IN HOSPITAL ROOM: TOTAL
DRESSING REGULAR UPPER BODY CLOTHING: A LOT
HELP NEEDED FOR BATHING: A LOT
MOVING TO AND FROM BED TO CHAIR: A LOT
STANDING UP FROM CHAIR USING ARMS: A LOT
CLIMB 3 TO 5 STEPS WITH RAILING: TOTAL
MOVING FROM LYING ON BACK TO SITTING ON SIDE OF FLAT BED WITH BEDRAILS: A LOT
MOVING TO AND FROM BED TO CHAIR: A LOT
HELP NEEDED FOR BATHING: A LITTLE
TURNING FROM BACK TO SIDE WHILE IN FLAT BAD: A LOT
DAILY ACTIVITIY SCORE: 17
TOILETING: A LITTLE
MOBILITY SCORE: 10
STANDING UP FROM CHAIR USING ARMS: A LOT
WALKING IN HOSPITAL ROOM: TOTAL
DRESSING REGULAR UPPER BODY CLOTHING: A LITTLE
MOBILITY SCORE: 10
TOILETING: A LITTLE

## 2024-01-19 ASSESSMENT — PAIN SCALES - GENERAL
PAINLEVEL_OUTOF10: 2
PAINLEVEL_OUTOF10: 3
PAINLEVEL_OUTOF10: 8

## 2024-01-19 ASSESSMENT — PAIN DESCRIPTION - LOCATION: LOCATION: BACK

## 2024-01-19 ASSESSMENT — PAIN - FUNCTIONAL ASSESSMENT
PAIN_FUNCTIONAL_ASSESSMENT: 0-10
PAIN_FUNCTIONAL_ASSESSMENT: 0-10

## 2024-01-19 NOTE — PROGRESS NOTES
Subjective Data:  Patient continues to admit to substernal chest tightness.  Admits to shortness of breath.  Otherwise in good spirits.    Overnight Events:    No other events overnight.     Objective Data:  Last Recorded Vitals:  Vitals:    01/18/24 1926 01/18/24 2300 01/19/24 0335 01/19/24 0828   BP: 145/67 128/69 135/65 140/63   Pulse: 99 89 86    Resp: 18 18 18    Temp: 36.6 °C (97.9 °F) 36.1 °C (97 °F) 36.6 °C (97.9 °F) 35.6 °C (96.1 °F)   TempSrc:       SpO2: 97% 100% 98% 100%   Weight:       Height:           Last Labs:  CBC - 1/18/2024:  5:34 AM  6.1 11.5 223    38.5      CMP - 1/18/2024:  5:34 AM  8.2 7.9 16 --- 0.3   _ 3.6 11 50      PTT - 1/15/2024:  4:37 AM  1.1   12.7 34     TROPHS   Date/Time Value Ref Range Status   01/16/2024 06:02  0 - 13 ng/L Final   01/15/2024 04:37  0 - 13 ng/L Final     Comment:     Previous result verified on 1/15/2024 0204 on specimen/case [id number] called with component [facility] for procedure Troponin I, High Sensitivity with value 189 ng/L.   01/15/2024 01:04  0 - 13 ng/L Final     BNP   Date/Time Value Ref Range Status   01/15/2024 12:08 AM 85 0 - 99 pg/mL Final   08/11/2023 04:10 PM 44 0 - 99 pg/mL Final     Comment:     .  <100 pg/mL - Heart failure unlikely  100-299 pg/mL - Intermediate probability of acute heart  .               failure exacerbation. Correlate with clinical  .               context and patient history.    >=300 pg/mL - Heart Failure likely. Correlate with clinical  .               context and patient history.  BNP testing is performed using different testing   methodology at [facility] than at other   [facility] hospitals. Direct result comparisons should   only be made within the same method.     12/25/2022 06:47 PM 43 0 - 99 pg/mL Final     Comment:     .  <100 pg/mL - Heart failure unlikely  100-299 pg/mL - Intermediate probability of acute heart  .               failure exacerbation. Correlate with clinical  .                context and patient history.    >=300 pg/mL - Heart Failure likely. Correlate with clinical  .               context and patient history.  BNP testing is performed using different testing   methodology at [facility] than at other   [facility] hospitals. Direct result comparisons should   only be made within the same method.       HGBA1C   Date/Time Value Ref Range Status   08/12/2023 06:52 AM 7.3 % Final     Comment:          Diagnosis of Diabetes-Adults   Non-Diabetic: < or = 5.6%   Increased risk for developing diabetes: 5.7-6.4%   Diagnostic of diabetes: > or = 6.5%  .       Monitoring of Diabetes                Age (y)     Therapeutic Goal (%)   Adults:          >18           <7.0   Pediatrics:    13-18           <7.5                   7-12           <8.0                   0- 6            7.5-8.5   American Diabetes Association. Diabetes Care 33(S1), Jan 2010.     05/31/2023 03:10 PM 7.3 4.2 - 5.6 % Final     Comment:     Location:[facility], [address] [place], [place], [address]  Point of care (POC) Hemoglobin A1c (HGBA1C) testing is intended to assess  glucose control and provide a management tool for patients known to have  diabetes and their healthcare providers.  Target HGBA1C levels may depend on  specific clinical circumstances.  POC HGBA1C is not intended for use as a  diagnostic or screening test; laboratory-based testing should be used for  diagnostic purposes.  The following information is supplemental and may not  be applicable to specific diabetes management situations:  The POC device   provides a normal range of 4.2% to 6.5% for the HGBA1C POC test.  However, the American Diabetes Association  guidelines indicate that  patients with HGBA1C in the range of 5.7% to 6.4% are at increased risk for  development of diabetes and that intervention by lifestyle modification may  be beneficial.  A HGBA1C level greater than or equal to 6.5% is  considered  diagnostic of diabetes, pending confirmatory testing.  Use of HGBA1C testing  to evaluate glucose control may not be appropriate for patients with  hemoglobin variants or other conditions (e.g. anemia) that alter red blood  cell lifespan.   12/26/2022 07:50 AM 8.1 % Final     Comment:          Diagnosis of Diabetes-Adults   Non-Diabetic: < or = 5.6%   Increased risk for developing diabetes: 5.7-6.4%   Diagnostic of diabetes: > or = 6.5%  .       Monitoring of Diabetes                Age (y)     Therapeutic Goal (%)   Adults:          >18           <7.0   Pediatrics:    13-18           <7.5                   7-12           <8.0                   0- 6            7.5-8.5   American Diabetes Association. Diabetes Care 33(S1), Jan 2010.     10/13/2022 09:54 AM 13.0 4.3 - 5.6 % Final     Comment:     American Diabetes Association guidelines indicate that patients with HgbA1c in the range 5.7-6.4% are at increased risk for development of diabetes, and intervention by lifestyle modification may be beneficial. HgbA1c greater or equal to 6.5% is considered diagnostic of diabetes.   07/19/2022 06:21 AM 11.5 4.3 - 5.6 % Final     Comment:     American Diabetes Association guidelines indicate that patients with HgbA1c in the range 5.7-6.4% are at increased risk for development of diabetes, and intervention by lifestyle modification may be beneficial. HgbA1c greater or equal to 6.5% is considered diagnostic of diabetes.     LDLCALC   Date/Time Value Ref Range Status   01/18/2024 05:34 AM 37 <=99 mg/dL Final     Comment:                                 Near   Borderline      AGE      Desirable  Optimal    High     High     Very High     0-19 Y     0 - 109     ---    110-129   >/= 130     ----    20-24 Y     0 - 119     ---    120-159   >/= 160     ----      >24 Y     0 -  99   100-129  130-159   160-189     >/=190       VLDL   Date/Time Value Ref Range Status   01/18/2024 05:34 AM 27 0 - 40 mg/dL Final   08/12/2023  06:52 AM 23 0 - 40 mg/dL Final   12/26/2022 07:50 AM 11 0 - 40 mg/dL Final      Last I/O:  I/O last 3 completed shifts:  In: - (0 mL/kg)   Out: 1200 (6.8 mL/kg) [Urine:1200 (0.2 mL/kg/hr)]  Weight: 176.9 kg     Past Cardiology Tests (Last 3 Years):  EKG:  ECG 12 lead 01/15/2024 (Preliminary)    Echo:  1/16/2024  CONCLUSIONS:   1. Left ventricular systolic function is normal with a 60-65% estimated ejection fraction.   2. Poorly visualized anatomical structures due to suboptimal image quality.    Ejection Fractions:  EF   Date/Time Value Ref Range Status   01/17/2024 10:16 AM 71       Cath:  1/17/2024  CONCLUSIONS:   1. Mild Non Obstructive Coronary Artery Disease.   2. Elevated left heart filling pressures.    Stress Test:  No results found for this or any previous visit from the past 1095 days.    Cardiac Imaging:  No results found for this or any previous visit from the past 1095 days.      Inpatient Medications:  Scheduled medications   Medication Dose Route Frequency    albuterol  2.5 mg nebulization TID    aspirin  81 mg oral Daily    atorvastatin  40 mg oral Nightly    budesonide  0.5 mg nebulization BID    capsaicin   Topical TID    cyclobenzaprine  10 mg oral q12h    DULoxetine  60 mg oral Daily    ergocalciferol  50,000 Units oral Once per day on Mon Thu    famotidine  20 mg oral Daily    formoterol  20 mcg nebulization q12h    furosemide  40 mg oral Daily    guaiFENesin  600 mg oral BID    insulin glargine  20 Units subcutaneous q24h    insulin lispro  0-10 Units subcutaneous TID with meals    methylPREDNISolone sodium succinate (PF)  40 mg intravenous q8h ISABELLE    montelukast  10 mg oral Nightly    pantoprazole  40 mg oral Daily before breakfast    polyethylene glycol  17 g oral Daily    pregabalin  200 mg oral BID    sennosides  1 tablet oral BID    spironolactone  12.5 mg oral Daily     PRN medications   Medication    acetaminophen    acetaminophen    albuterol    albuterol    benzonatate    bisacodyl     butalbital-acetaminophen-caff    calcium carbonate    dextrose 10 % in water (D10W)    dextrose    glucagon    guaiFENesin    LORazepam    oxyCODONE-acetaminophen    oxygen     Continuous Medications   Medication Dose Last Rate       Physical Exam:  Physical Exam  Vitals reviewed.   Constitutional:       Appearance: Normal appearance.   HENT:      Head: Normocephalic and atraumatic.      Mouth/Throat:      Mouth: Mucous membranes are moist.      Pharynx: Oropharynx is clear.   Cardiovascular:      Rate and Rhythm: Normal rate and regular rhythm.      Pulses: Normal pulses.      Heart sounds: Normal heart sounds.   Pulmonary:      Effort: Pulmonary effort is normal.      Breath sounds: Wheezing present.   Abdominal:      General: Bowel sounds are normal.      Palpations: Abdomen is soft.   Musculoskeletal:      Cervical back: Neck supple.   Skin:     General: Skin is warm and dry.   Neurological:      General: No focal deficit present.      Mental Status: She is alert.   Psychiatric:         Mood and Affect: Mood normal.         Behavior: Behavior normal.           Assessment/Plan   1/16/2024  1.  NSTEMI: Patient with elevation in troponins relatively quickly and decrease.  Uncertain if this is related to underlying coronary artery disease or stress related to her flu a infection.  Will go ahead and repeat an echocardiogram to evaluate LV function.  Given her 2 weeks of discomforts and chest tightness, may warrant further evaluation.  Will go ahead and restart her aspirin and atorvastatin.  Would continue heparin drip for now.  Given her respiratory distress, will hold off on beta-blockade.  2.  History of HFpEF: Appears fairly euvolemic at this time.  Will hold off on additional diuretic.  Will reassess daily.  3.  Hypertension: Currently not an issue.  4.  Hyperlipidemia: Continue statin therapy.  5.  Further recommendations to follow.    1/17/2024  1.  NSTEMI: Echocardiogram is pending.  Given the patient's  persistent symptoms, we will go ahead and pursue cardiac catheterization today.  Continue aspirin and heparin drip.  Continue atorvastatin.  2.  History of HFpEF: Lasix and Aldactone was restarted.  3.  Hypertension  4.  Hyperlipidemia    1/19/2024  1.  NSTEMI: Echocardiogram with normal LV function.  Cardiac catheterization with mild, nonobstructive coronary artery disease.  Continue aspirin and statin therapy.  2.  History of HFpEF: EDP was elevated on cardiac catheterization.  Patient is net -1.8 L since hospitalization.  Would continue Lasix and Aldactone.  3.  Hypertension  4.  Hyperlipidemia  5.  Disposition: Patient is otherwise stable for discharge from a cardiac standpoint.    Peripheral IV 01/15/24 20 G Left Antecubital (Active)   Site Assessment Clean;Dry;Intact 01/17/24 0746   Dressing Status Clean;Dry 01/17/24 0746   Number of days: 2       Code Status:  Full Code    Chaitanya Adrian MD

## 2024-01-19 NOTE — PROGRESS NOTES
Occupational Therapy                 Therapy Communication Note    Patient Name: Jacinta Castrejon  MRN: [id number]  Today's Date: 1/19/2024     Discipline: Occupational Therapy    Missed Visit Reason: Missed Visit Reason:  (Chart reviewed.  Attempted initial OT eval at 13:26 pm however patient adamantly refused, requesting paperwork of therapy evals needed to return to facility.  Case conference with FELECIA/Venecia regarding above.)    Missed Time: Attempt

## 2024-01-19 NOTE — PROGRESS NOTES
"Physical Therapy                 Therapy Communication Note    Patient Name: Jacinta Castrejon  MRN: [id number]  Today's Date: 1/19/2024     Discipline: Physical Therapy    Missed Visit Reason: Missed Visit Reason:  (Patient adamantly refused participation in therapy eval \"until I see the paperwork\" indicating need for eval to facilitate return to facility, TCC informed)       "

## 2024-01-19 NOTE — PROGRESS NOTES
Dispo planning to [facility]. Referral sent per previous TCC. Patient able to return to [facility]. Patient needs precert to return. Provider messaged with need for PT/OT orders.  Will need transport arranged when medically ready. TCC to continue to follow for discharge planning.     UPDATE 1330: PT/OT notified this TCC pt refusing to work with them, states needs something in writing that PT/OT needed. Message to [facility] if able to explain to patient why PT/OT needed for LTC auth.      KEN HENDERSON, RN TCC

## 2024-01-20 LAB
ANION GAP SERPL CALC-SCNC: 12 MMOL/L (ref 10–20)
BUN SERPL-MCNC: 22 MG/DL (ref 6–23)
CALCIUM SERPL-MCNC: 9.2 MG/DL (ref 8.6–10.3)
CHLORIDE SERPL-SCNC: 95 MMOL/L (ref 98–107)
CO2 SERPL-SCNC: 32 MMOL/L (ref 21–32)
CREAT SERPL-MCNC: 0.72 MG/DL (ref 0.5–1.05)
EGFRCR SERPLBLD CKD-EPI 2021: >90 ML/MIN/1.73M*2
ERYTHROCYTE [DISTWIDTH] IN BLOOD BY AUTOMATED COUNT: 15.2 % (ref 11.5–14.5)
GLUCOSE BLD MANUAL STRIP-MCNC: 353 MG/DL (ref 74–99)
GLUCOSE BLD MANUAL STRIP-MCNC: 357 MG/DL (ref 74–99)
GLUCOSE BLD MANUAL STRIP-MCNC: 376 MG/DL (ref 74–99)
GLUCOSE BLD MANUAL STRIP-MCNC: 386 MG/DL (ref 74–99)
GLUCOSE SERPL-MCNC: 397 MG/DL (ref 74–99)
HCT VFR BLD AUTO: 37.7 % (ref 36–46)
HGB BLD-MCNC: 11.7 G/DL (ref 12–16)
MCH RBC QN AUTO: 26.6 PG (ref 26–34)
MCHC RBC AUTO-ENTMCNC: 31 G/DL (ref 32–36)
MCV RBC AUTO: 86 FL (ref 80–100)
NRBC BLD-RTO: 0 /100 WBCS (ref 0–0)
PLATELET # BLD AUTO: 260 X10*3/UL (ref 150–450)
POTASSIUM SERPL-SCNC: 4.6 MMOL/L (ref 3.5–5.3)
RBC # BLD AUTO: 4.4 X10*6/UL (ref 4–5.2)
SODIUM SERPL-SCNC: 134 MMOL/L (ref 136–145)
WBC # BLD AUTO: 10.9 X10*3/UL (ref 4.4–11.3)

## 2024-01-20 PROCEDURE — 2500000004 HC RX 250 GENERAL PHARMACY W/ HCPCS (ALT 636 FOR OP/ED): Performed by: NURSE PRACTITIONER

## 2024-01-20 PROCEDURE — 2500000001 HC RX 250 WO HCPCS SELF ADMINISTERED DRUGS (ALT 637 FOR MEDICARE OP): Performed by: NURSE PRACTITIONER

## 2024-01-20 PROCEDURE — 94640 AIRWAY INHALATION TREATMENT: CPT

## 2024-01-20 PROCEDURE — 36415 COLL VENOUS BLD VENIPUNCTURE: CPT | Performed by: INTERNAL MEDICINE

## 2024-01-20 PROCEDURE — 99232 SBSQ HOSP IP/OBS MODERATE 35: CPT | Performed by: INTERNAL MEDICINE

## 2024-01-20 PROCEDURE — 2500000002 HC RX 250 W HCPCS SELF ADMINISTERED DRUGS (ALT 637 FOR MEDICARE OP, ALT 636 FOR OP/ED): Performed by: NURSE PRACTITIONER

## 2024-01-20 PROCEDURE — 1100000001 HC PRIVATE ROOM DAILY

## 2024-01-20 PROCEDURE — 85027 COMPLETE CBC AUTOMATED: CPT | Performed by: INTERNAL MEDICINE

## 2024-01-20 PROCEDURE — 82374 ASSAY BLOOD CARBON DIOXIDE: CPT | Performed by: INTERNAL MEDICINE

## 2024-01-20 PROCEDURE — 82947 ASSAY GLUCOSE BLOOD QUANT: CPT

## 2024-01-20 PROCEDURE — 2500000002 HC RX 250 W HCPCS SELF ADMINISTERED DRUGS (ALT 637 FOR MEDICARE OP, ALT 636 FOR OP/ED): Performed by: INTERNAL MEDICINE

## 2024-01-20 RX ORDER — INSULIN GLARGINE 100 [IU]/ML
30 INJECTION, SOLUTION SUBCUTANEOUS EVERY 24 HOURS
Status: DISCONTINUED | OUTPATIENT
Start: 2024-01-21 | End: 2024-01-24 | Stop reason: HOSPADM

## 2024-01-20 RX ORDER — INSULIN GLARGINE 100 [IU]/ML
10 INJECTION, SOLUTION SUBCUTANEOUS ONCE
Status: COMPLETED | OUTPATIENT
Start: 2024-01-20 | End: 2024-01-20

## 2024-01-20 RX ADMIN — CYCLOBENZAPRINE 10 MG: 10 TABLET, FILM COATED ORAL at 00:32

## 2024-01-20 RX ADMIN — SENNOSIDES 8.6 MG: 8.6 TABLET, FILM COATED ORAL at 20:52

## 2024-01-20 RX ADMIN — FUROSEMIDE 40 MG: 40 TABLET ORAL at 08:15

## 2024-01-20 RX ADMIN — DULOXETINE HYDROCHLORIDE 60 MG: 30 CAPSULE, DELAYED RELEASE ORAL at 08:15

## 2024-01-20 RX ADMIN — ATORVASTATIN CALCIUM 40 MG: 40 TABLET, FILM COATED ORAL at 20:51

## 2024-01-20 RX ADMIN — SENNOSIDES 8.6 MG: 8.6 TABLET, FILM COATED ORAL at 08:15

## 2024-01-20 RX ADMIN — OXYCODONE HYDROCHLORIDE AND ACETAMINOPHEN 1 TABLET: 5; 325 TABLET ORAL at 00:32

## 2024-01-20 RX ADMIN — FAMOTIDINE 20 MG: 20 TABLET ORAL at 08:15

## 2024-01-20 RX ADMIN — POLYETHYLENE GLYCOL 3350 17 G: 17 POWDER, FOR SOLUTION ORAL at 08:14

## 2024-01-20 RX ADMIN — ASPIRIN 81 MG: 81 TABLET, CHEWABLE ORAL at 08:15

## 2024-01-20 RX ADMIN — CYCLOBENZAPRINE 10 MG: 10 TABLET, FILM COATED ORAL at 13:10

## 2024-01-20 RX ADMIN — PREGABALIN 200 MG: 75 CAPSULE ORAL at 20:51

## 2024-01-20 RX ADMIN — ALBUTEROL SULFATE 2.5 MG: 2.5 SOLUTION RESPIRATORY (INHALATION) at 11:17

## 2024-01-20 RX ADMIN — GUAIFENESIN 600 MG: 600 TABLET, EXTENDED RELEASE ORAL at 08:15

## 2024-01-20 RX ADMIN — SPIRONOLACTONE 12.5 MG: 25 TABLET, FILM COATED ORAL at 08:15

## 2024-01-20 RX ADMIN — MONTELUKAST 10 MG: 10 TABLET, FILM COATED ORAL at 20:53

## 2024-01-20 RX ADMIN — INSULIN LISPRO 10 UNITS: 100 INJECTION, SOLUTION INTRAVENOUS; SUBCUTANEOUS at 12:24

## 2024-01-20 RX ADMIN — CAPSAICIN: 0.25 CREAM TOPICAL at 15:12

## 2024-01-20 RX ADMIN — PANTOPRAZOLE SODIUM 40 MG: 40 TABLET, DELAYED RELEASE ORAL at 06:25

## 2024-01-20 RX ADMIN — METHYLPREDNISOLONE SODIUM SUCCINATE 40 MG: 40 INJECTION, POWDER, FOR SOLUTION INTRAMUSCULAR; INTRAVENOUS at 06:25

## 2024-01-20 RX ADMIN — GUAIFENESIN 600 MG: 600 TABLET, EXTENDED RELEASE ORAL at 20:52

## 2024-01-20 RX ADMIN — INSULIN GLARGINE 10 UNITS: 100 INJECTION, SOLUTION SUBCUTANEOUS at 10:52

## 2024-01-20 RX ADMIN — CAPSAICIN: 0.25 CREAM TOPICAL at 08:15

## 2024-01-20 RX ADMIN — ALBUTEROL SULFATE 2.5 MG: 2.5 SOLUTION RESPIRATORY (INHALATION) at 23:40

## 2024-01-20 RX ADMIN — PREGABALIN 200 MG: 75 CAPSULE ORAL at 08:15

## 2024-01-20 RX ADMIN — INSULIN LISPRO 10 UNITS: 100 INJECTION, SOLUTION INTRAVENOUS; SUBCUTANEOUS at 16:53

## 2024-01-20 RX ADMIN — INSULIN LISPRO 10 UNITS: 100 INJECTION, SOLUTION INTRAVENOUS; SUBCUTANEOUS at 08:16

## 2024-01-20 RX ADMIN — INSULIN GLARGINE 20 UNITS: 100 INJECTION, SOLUTION SUBCUTANEOUS at 08:16

## 2024-01-20 ASSESSMENT — PAIN SCALES - GENERAL
PAINLEVEL_OUTOF10: 4
PAINLEVEL_OUTOF10: 2
PAINLEVEL_OUTOF10: 0 - NO PAIN
PAINLEVEL_OUTOF10: 0 - NO PAIN
PAINLEVEL_OUTOF10: 7

## 2024-01-20 ASSESSMENT — COGNITIVE AND FUNCTIONAL STATUS - GENERAL
HELP NEEDED FOR BATHING: A LOT
STANDING UP FROM CHAIR USING ARMS: A LOT
MOVING TO AND FROM BED TO CHAIR: A LOT
TOILETING: A LITTLE
MOBILITY SCORE: 10
DRESSING REGULAR UPPER BODY CLOTHING: A LOT
WALKING IN HOSPITAL ROOM: TOTAL
CLIMB 3 TO 5 STEPS WITH RAILING: TOTAL
DRESSING REGULAR LOWER BODY CLOTHING: A LOT
TURNING FROM BACK TO SIDE WHILE IN FLAT BAD: A LOT
DAILY ACTIVITIY SCORE: 17
MOVING FROM LYING ON BACK TO SITTING ON SIDE OF FLAT BED WITH BEDRAILS: A LOT

## 2024-01-20 ASSESSMENT — PAIN - FUNCTIONAL ASSESSMENT
PAIN_FUNCTIONAL_ASSESSMENT: 0-10

## 2024-01-20 ASSESSMENT — PAIN DESCRIPTION - LOCATION: LOCATION: HEAD

## 2024-01-20 NOTE — CARE PLAN
Problem: Pain  Goal: My pain/discomfort is manageable  1/20/2024 1038 by Dalia Pappas RN  Outcome: Progressing  1/20/2024 1032 by Dalia Pappas RN  Outcome: Progressing     Problem: Safety  Goal: Patient will be injury free during hospitalization  1/20/2024 1038 by Dalia Pappas RN  Outcome: Progressing  1/20/2024 1032 by Dalia Pappas RN  Outcome: Progressing  Goal: I will remain free of falls  1/20/2024 1038 by Dalia Pappas RN  Outcome: Progressing  1/20/2024 1032 by Dalia Pappas RN  Outcome: Progressing     Problem: Daily Care  Goal: Daily care needs are met  1/20/2024 1038 by Dalia Pappas RN  Outcome: Progressing  1/20/2024 1032 by Dalia Ppapas RN  Outcome: Progressing     Problem: Psychosocial Needs  Goal: Demonstrates ability to cope with hospitalization/illness  1/20/2024 1038 by Dalia Pappas RN  Outcome: Progressing  1/20/2024 1032 by Dalia Pappas RN  Outcome: Progressing  Goal: Collaborate with me, my family, and caregiver to identify my specific goals  1/20/2024 1038 by Dalia Pappas RN  Outcome: Progressing  1/20/2024 1032 by Dalia Pappas RN  Outcome: Progressing     Problem: Discharge Barriers  Goal: My discharge needs are met  1/20/2024 1038 by Dalia Pappas RN  Outcome: Progressing  1/20/2024 1032 by Dalia Pappas RN  Outcome: Progressing     Problem: Pain - Adult  Goal: Verbalizes/displays adequate comfort level or baseline comfort level  1/20/2024 1038 by Dalia Pappas RN  Outcome: Progressing  1/20/2024 1032 by Dalia Pappas RN  Outcome: Progressing     Problem: Safety - Adult  Goal: Free from fall injury  1/20/2024 1038 by Dalia Pappas RN  Outcome: Progressing  1/20/2024 1032 by Dalia Pappas RN  Outcome: Progressing     Problem: Discharge Planning  Goal: Discharge to home or other facility with appropriate resources  1/20/2024 1038 by Dalia Pappas RN  Outcome: Progressing  1/20/2024 1032 by Dalia Pappas RN  Outcome: Progressing     Problem:  Chronic Conditions and Co-morbidities  Goal: Patient's chronic conditions and co-morbidity symptoms are monitored and maintained or improved  1/20/2024 1038 by Dalia Pappas RN  Outcome: Progressing  1/20/2024 1032 by Dalia Pappas RN  Outcome: Progressing     Problem: Skin  Goal: Decreased wound size/increased tissue granulation at next dressing change  1/20/2024 1038 by Dalia Pappas RN  Outcome: Progressing  Flowsheets (Taken 1/19/2024 1649 by Linda Arenas RN)  Decreased wound size/increased tissue granulation at next dressing change:   Protective dressings over bony prominences   Promote sleep for wound healing  1/20/2024 1032 by Dalia Pappas RN  Outcome: Progressing  Flowsheets (Taken 1/19/2024 1649 by Linda Arenas RN)  Decreased wound size/increased tissue granulation at next dressing change:   Protective dressings over bony prominences   Promote sleep for wound healing  Goal: Participates in plan/prevention/treatment measures  1/20/2024 1038 by Dalia Pappas RN  Outcome: Progressing  Flowsheets (Taken 1/20/2024 0108 by Kimberli Francisco RN)  Participates in plan/prevention/treatment measures: Elevate heels  1/20/2024 1032 by Dalia Pappas RN  Outcome: Progressing  Flowsheets (Taken 1/20/2024 0108 by Kimberli Francisco RN)  Participates in plan/prevention/treatment measures: Elevate heels  Goal: Prevent/manage excess moisture  1/20/2024 1038 by Dalia Pappas RN  Outcome: Progressing  Flowsheets (Taken 1/20/2024 0108 by Kimberli Francisco RN)  Prevent/manage excess moisture:   Cleanse incontinence/protect with barrier cream   Moisturize dry skin  1/20/2024 1032 by Dalia Pappas RN  Outcome: Progressing  Flowsheets (Taken 1/20/2024 0108 by Kimberli Francisco RN)  Prevent/manage excess moisture:   Cleanse incontinence/protect with barrier cream   Moisturize dry skin  Goal: Prevent/minimize sheer/friction injuries  1/20/2024 1038 by Dalia Pappas RN  Outcome: Progressing  Flowsheets (Taken  1/20/2024 0108 by Kimberli Francisco RN)  Prevent/minimize sheer/friction injuries: Complete micro-shifts as needed if patient unable. Adjust patient position to relieve pressure points, not a full turn  1/20/2024 1032 by Dalia Pappas RN  Outcome: Progressing  Flowsheets (Taken 1/20/2024 0108 by Kimberli Francisco RN)  Prevent/minimize sheer/friction injuries: Complete micro-shifts as needed if patient unable. Adjust patient position to relieve pressure points, not a full turn  Goal: Promote/optimize nutrition  1/20/2024 1038 by Dalia Pappas RN  Outcome: Progressing  Flowsheets (Taken 1/19/2024 1649 by Linda Arenas, VELMA)  Promote/optimize nutrition:   Discuss with provider if NPO > 2 days   Consume > 50% meals/supplements   Monitor/record intake including meals   Assist with feeding  1/20/2024 1032 by Dalia Pappas RN  Outcome: Progressing  Flowsheets (Taken 1/19/2024 1649 by Linda Arenas RN)  Promote/optimize nutrition:   Discuss with provider if NPO > 2 days   Consume > 50% meals/supplements   Monitor/record intake including meals   Assist with feeding  Goal: Promote skin healing  1/20/2024 1038 by Dalia Pappas RN  Outcome: Progressing  Flowsheets (Taken 1/20/2024 0108 by Kimberli Francisco RN)  Promote skin healing: Assess skin/pad under line(s)/device(s)  1/20/2024 1032 by Dalia Pappas RN  Outcome: Progressing  Flowsheets (Taken 1/20/2024 0108 by Kimberli Francisco RN)  Promote skin healing: Assess skin/pad under line(s)/device(s)     Problem: Pain  Goal: Takes deep breaths with improved pain control throughout the shift  1/20/2024 1038 by Dalia Pappas RN  Outcome: Progressing  1/20/2024 1032 by Dalia Pappas RN  Outcome: Progressing  Goal: Turns in bed with improved pain control throughout the shift  1/20/2024 1038 by Dalia Pappas RN  Outcome: Progressing  1/20/2024 1032 by Dalia Pappas RN  Outcome: Progressing  Goal: Walks with improved pain control throughout the shift  1/20/2024  1038 by Dalia Pappas RN  Outcome: Progressing  1/20/2024 1032 by Dalia Pappas RN  Outcome: Progressing  Goal: Performs ADL's with improved pain control throughout shift  1/20/2024 1038 by Dalia Pappas RN  Outcome: Progressing  1/20/2024 1032 by Dalia Pappas RN  Outcome: Progressing  Goal: Participates in PT with improved pain control throughout the shift  1/20/2024 1038 by Dalia Pappas RN  Outcome: Progressing  1/20/2024 1032 by Dalia Pappas RN  Outcome: Progressing  Goal: Free from opioid side effects throughout the shift  1/20/2024 1038 by Dalia Pappas RN  Outcome: Progressing  1/20/2024 1032 by Dalia Pappas RN  Outcome: Progressing  Goal: Free from acute confusion related to pain meds throughout the shift  1/20/2024 1038 by Dalia Pappas RN  Outcome: Progressing  1/20/2024 1032 by Dalia Pappas RN  Outcome: Progressing   The patient's goals for the shift include rest and comfort    The clinical goals for the shift include Have uninterrupted sleep greater than 4 hours    Over the shift, the patient did not make progress toward the following goals. Barriers to progression include refuses to follow POC. Recommendations to address these barriers include follow POC.

## 2024-01-20 NOTE — PROGRESS NOTES
SW contacted [facility] [place] clarification on what is needed for pt to admit. Pt will need auth. [facility] requesting documentation showing pt ADLs with PT and OT. However, pt will not participate. Level of Care form should be sufficient stating pts need for LTC. SW left a msg for [facility] admissions. SW will follow up.    NORMA LOPEZ

## 2024-01-20 NOTE — CARE PLAN
Problem: Pain  Goal: My pain/discomfort is manageable  Outcome: Progressing     Problem: Safety  Goal: Patient will be injury free during hospitalization  Outcome: Progressing  Goal: I will remain free of falls  Outcome: Progressing     Problem: Daily Care  Goal: Daily care needs are met  Outcome: Progressing     Problem: Psychosocial Needs  Goal: Demonstrates ability to cope with hospitalization/illness  Outcome: Progressing  Goal: Collaborate with me, my family, and caregiver to identify my specific goals  Outcome: Progressing     Problem: Discharge Barriers  Goal: My discharge needs are met  Outcome: Progressing     Problem: Pain - Adult  Goal: Verbalizes/displays adequate comfort level or baseline comfort level  Outcome: Progressing     Problem: Safety - Adult  Goal: Free from fall injury  Outcome: Progressing     Problem: Discharge Planning  Goal: Discharge to home or other facility with appropriate resources  Outcome: Progressing     Problem: Chronic Conditions and Co-morbidities  Goal: Patient's chronic conditions and co-morbidity symptoms are monitored and maintained or improved  Outcome: Progressing     Problem: Skin  Goal: Decreased wound size/increased tissue granulation at next dressing change  Outcome: Progressing  Flowsheets (Taken 1/19/2024 1649 by Linda Arenas RN)  Decreased wound size/increased tissue granulation at next dressing change:   Protective dressings over bony prominences   Promote sleep for wound healing  Goal: Participates in plan/prevention/treatment measures  Outcome: Progressing  Flowsheets (Taken 1/20/2024 0108 by Kimberli Francisco RN)  Participates in plan/prevention/treatment measures: Elevate heels  Goal: Prevent/manage excess moisture  Outcome: Progressing  Flowsheets (Taken 1/20/2024 0108 by Kimberli Francisco RN)  Prevent/manage excess moisture:   Cleanse incontinence/protect with barrier cream   Moisturize dry skin  Goal: Prevent/minimize sheer/friction injuries  Outcome:  Progressing  Flowsheets (Taken 1/20/2024 0108 by Kimberli Francisco, RN)  Prevent/minimize sheer/friction injuries: Complete micro-shifts as needed if patient unable. Adjust patient position to relieve pressure points, not a full turn  Goal: Promote/optimize nutrition  Outcome: Progressing  Flowsheets (Taken 1/19/2024 1649 by Linda Arenas RN)  Promote/optimize nutrition:   Discuss with provider if NPO > 2 days   Consume > 50% meals/supplements   Monitor/record intake including meals   Assist with feeding  Goal: Promote skin healing  Outcome: Progressing  Flowsheets (Taken 1/20/2024 0108 by Kimberli Francisco, RN)  Promote skin healing: Assess skin/pad under line(s)/device(s)     Problem: Pain  Goal: Takes deep breaths with improved pain control throughout the shift  Outcome: Progressing  Goal: Turns in bed with improved pain control throughout the shift  Outcome: Progressing  Goal: Walks with improved pain control throughout the shift  Outcome: Progressing  Goal: Performs ADL's with improved pain control throughout shift  Outcome: Progressing  Goal: Participates in PT with improved pain control throughout the shift  Outcome: Progressing  Goal: Free from opioid side effects throughout the shift  Outcome: Progressing  Goal: Free from acute confusion related to pain meds throughout the shift  Outcome: Progressing     Problem: Pain  Goal: My pain/discomfort is manageable  Outcome: Progressing     Problem: Safety  Goal: Patient will be injury free during hospitalization  Outcome: Progressing  Goal: I will remain free of falls  Outcome: Progressing     Problem: Daily Care  Goal: Daily care needs are met  Outcome: Progressing     Problem: Psychosocial Needs  Goal: Demonstrates ability to cope with hospitalization/illness  Outcome: Progressing  Goal: Collaborate with me, my family, and caregiver to identify my specific goals  Outcome: Progressing     Problem: Discharge Barriers  Goal: My discharge needs are met  Outcome:  Progressing     Problem: Pain - Adult  Goal: Verbalizes/displays adequate comfort level or baseline comfort level  Outcome: Progressing     Problem: Safety - Adult  Goal: Free from fall injury  Outcome: Progressing     Problem: Discharge Planning  Goal: Discharge to home or other facility with appropriate resources  Outcome: Progressing     Problem: Chronic Conditions and Co-morbidities  Goal: Patient's chronic conditions and co-morbidity symptoms are monitored and maintained or improved  Outcome: Progressing     Problem: Skin  Goal: Decreased wound size/increased tissue granulation at next dressing change  Outcome: Progressing  Flowsheets (Taken 1/19/2024 1649 by Linda Arenas RN)  Decreased wound size/increased tissue granulation at next dressing change:   Protective dressings over bony prominences   Promote sleep for wound healing  Goal: Participates in plan/prevention/treatment measures  Outcome: Progressing  Flowsheets (Taken 1/20/2024 0108 by Kimberli Francisco RN)  Participates in plan/prevention/treatment measures: Elevate heels  Goal: Prevent/manage excess moisture  Outcome: Progressing  Flowsheets (Taken 1/20/2024 0108 by Kimberli Francisco RN)  Prevent/manage excess moisture:   Cleanse incontinence/protect with barrier cream   Moisturize dry skin  Goal: Prevent/minimize sheer/friction injuries  Outcome: Progressing  Flowsheets (Taken 1/20/2024 0108 by Kimberli Francisco RN)  Prevent/minimize sheer/friction injuries: Complete micro-shifts as needed if patient unable. Adjust patient position to relieve pressure points, not a full turn  Goal: Promote/optimize nutrition  Outcome: Progressing  Flowsheets (Taken 1/19/2024 1649 by Linda Arenas RN)  Promote/optimize nutrition:   Discuss with provider if NPO > 2 days   Consume > 50% meals/supplements   Monitor/record intake including meals   Assist with feeding  Goal: Promote skin healing  Outcome: Progressing  Flowsheets (Taken 1/20/2024 0108 by Kimberli  VEMLA Francisco)  Promote skin healing: Assess skin/pad under line(s)/device(s)     Problem: Pain  Goal: Takes deep breaths with improved pain control throughout the shift  Outcome: Progressing  Goal: Turns in bed with improved pain control throughout the shift  Outcome: Progressing  Goal: Walks with improved pain control throughout the shift  Outcome: Progressing  Goal: Performs ADL's with improved pain control throughout shift  Outcome: Progressing  Goal: Participates in PT with improved pain control throughout the shift  Outcome: Progressing  Goal: Free from opioid side effects throughout the shift  Outcome: Progressing  Goal: Free from acute confusion related to pain meds throughout the shift  Outcome: Progressing     Problem: Pain  Goal: My pain/discomfort is manageable  Outcome: Progressing     Problem: Safety  Goal: Patient will be injury free during hospitalization  Outcome: Progressing  Goal: I will remain free of falls  Outcome: Progressing     Problem: Daily Care  Goal: Daily care needs are met  Outcome: Progressing     Problem: Psychosocial Needs  Goal: Demonstrates ability to cope with hospitalization/illness  Outcome: Progressing  Goal: Collaborate with me, my family, and caregiver to identify my specific goals  Outcome: Progressing     Problem: Discharge Barriers  Goal: My discharge needs are met  Outcome: Progressing     Problem: Pain - Adult  Goal: Verbalizes/displays adequate comfort level or baseline comfort level  Outcome: Progressing     Problem: Safety - Adult  Goal: Free from fall injury  Outcome: Progressing     Problem: Discharge Planning  Goal: Discharge to home or other facility with appropriate resources  Outcome: Progressing     Problem: Chronic Conditions and Co-morbidities  Goal: Patient's chronic conditions and co-morbidity symptoms are monitored and maintained or improved  Outcome: Progressing     Problem: Skin  Goal: Decreased wound size/increased tissue granulation at next dressing  change  Outcome: Progressing  Flowsheets (Taken 1/19/2024 1649 by Linda Arenas RN)  Decreased wound size/increased tissue granulation at next dressing change:   Protective dressings over bony prominences   Promote sleep for wound healing  Goal: Participates in plan/prevention/treatment measures  Outcome: Progressing  Flowsheets (Taken 1/20/2024 0108 by Kimberli Francisco, RN)  Participates in plan/prevention/treatment measures: Elevate heels  Goal: Prevent/manage excess moisture  Outcome: Progressing  Flowsheets (Taken 1/20/2024 0108 by Kimberli Francisco, RN)  Prevent/manage excess moisture:   Cleanse incontinence/protect with barrier cream   Moisturize dry skin  Goal: Prevent/minimize sheer/friction injuries  Outcome: Progressing  Flowsheets (Taken 1/20/2024 0108 by Kimberli Farncisco, RN)  Prevent/minimize sheer/friction injuries: Complete micro-shifts as needed if patient unable. Adjust patient position to relieve pressure points, not a full turn  Goal: Promote/optimize nutrition  Outcome: Progressing  Flowsheets (Taken 1/19/2024 1649 by Linda Arenas, VELMA)  Promote/optimize nutrition:   Discuss with provider if NPO > 2 days   Consume > 50% meals/supplements   Monitor/record intake including meals   Assist with feeding  Goal: Promote skin healing  Outcome: Progressing  Flowsheets (Taken 1/20/2024 0108 by Kimberli Francisco RN)  Promote skin healing: Assess skin/pad under line(s)/device(s)     Problem: Pain  Goal: Takes deep breaths with improved pain control throughout the shift  Outcome: Progressing  Goal: Turns in bed with improved pain control throughout the shift  Outcome: Progressing  Goal: Walks with improved pain control throughout the shift  Outcome: Progressing  Goal: Performs ADL's with improved pain control throughout shift  Outcome: Progressing  Goal: Participates in PT with improved pain control throughout the shift  Outcome: Progressing  Goal: Free from opioid side effects throughout the  shift  Outcome: Progressing  Goal: Free from acute confusion related to pain meds throughout the shift  Outcome: Progressing   The patient's goals for the shift include  rest and comfort    The clinical goals for the shift include Have uninterrupted sleep greater than 4 hours    Over the shift, the patient did not make progress toward the following goals. Barriers to progression include Pt refusing care. Recommendations to address these barriers include follow POC.

## 2024-01-20 NOTE — PROGRESS NOTES
"Jacinta Castrejon is a 64 y.o. female on day 4 of admission presenting with Shortness of breath.    Subjective   No events overnight.  Patient seen and examined at bedside.  She has no acute complaints.  Patient awaiting pre-CERT to return to SNF.  However, she refuses to work with PT OT.       Objective     Physical Exam  Constitutional:       Appearance: Normal appearance.   HENT:      Head: Normocephalic and atraumatic.      Right Ear: Tympanic membrane and ear canal normal.      Left Ear: Tympanic membrane and ear canal normal.      Mouth/Throat:      Mouth: Mucous membranes are moist.      Pharynx: Oropharynx is clear.   Eyes:      Extraocular Movements: Extraocular movements intact.      Conjunctiva/sclera: Conjunctivae normal.      Pupils: Pupils are equal, round, and reactive to light.   Cardiovascular:      Rate and Rhythm: Normal rate and regular rhythm.      Pulses: Normal pulses.      Heart sounds: Normal heart sounds.   Pulmonary:      Effort: Pulmonary effort is normal.      Breath sounds: Normal breath sounds.   Abdominal:      General: Abdomen is flat. Bowel sounds are normal.      Palpations: Abdomen is soft.   Musculoskeletal:         General: Normal range of motion.      Cervical back: Normal range of motion and neck supple.   Skin:     General: Skin is warm and dry.      Capillary Refill: Capillary refill takes 2 to 3 seconds.   Neurological:      General: No focal deficit present.      Mental Status: She is alert and oriented to person, place, and time. Mental status is at baseline.   Psychiatric:         Mood and Affect: Mood normal.         Behavior: Behavior normal.         Thought Content: Thought content normal.         Judgment: Judgment normal.         Last Recorded Vitals  Blood pressure 139/80, pulse 80, temperature 35.6 °C (96.1 °F), resp. rate 18, height 1.676 m (5' 5.98\"), weight (!) 177 kg (390 lb), SpO2 100 %.  Intake/Output last 3 Shifts:  I/O last 3 completed shifts:  In: - (0 mL/kg) "   Out: 650 (3.7 mL/kg) [Urine:650 (0.1 mL/kg/hr)]  Weight: 176.9 kg     Relevant Results           This patient currently has cardiac telemetry ordered; if you would like to modify or discontinue the telemetry order, click here to go to the orders activity to modify/discontinue the order.                 Assessment/Plan   Active Problems:  There are no active Hospital Problems.    64 year old female with a past medical history of COPD chronically on 4 L oxygen, DIONICIO with CPAP, diabetes, hypertension, hyperlipidemia, heart failure, morbid obesity, who presented to [facility] ED today via EMS from nursing facility with shortness of breath.  Per ED physician, “Facility was reporting that patient was 30% on room air although we do believe this is likely to be inaccurate as she is still mentating appropriately.  EMS reported that she was in the 70s when they arrived.  They placed her on nonrebreather and she did improve into the mid to low 90s.”   She reports that she has been short of breath for a few days now. Cardiology consulted. Continue heparin, steroids, nebulizers, and BiPAP as needed. Patient to be admitted to hospital for further medical management.     #COPD Exacerbation on 4L continuous oxygen at home  #Acute on chronic respiratory failure with hypoxia and hypercapnia and supplemental oxygen requirement  #Influenza A+  #Shortness of breath  #Hx of DIONICIO  #Morbid obesity  Admit to inpatient/telemetry to Dr. Mahajan  See imaging results above  Continue BiPAP as needed. Wean BiPAP as able  Titrate oxygen to maintain sats >90%  Nebulizers  Solu-medrol 40mg IVP every 8 hours  Incentive spirometer  Droplet Isolation  UA ordered  Repeat labs in AM     #Suspect NSTEMI  #Elevated troponin   Consult cardiology and appreciate recs  Echocardiogram 8/15/23: EF 60-65%  NPO until cleared by cardiology  Aspirin ordered  Troponin 49, 189. Will trend.  Initiate heparin infusion     #Acute on chronic anemia  H&H 10.7/34.9  Continue to  monitor     Chronic issues  #DMII  #HTN  #HLD  #CHF  Continue home meds as appropriate when nurse completes home medication reconciliation  SSI with hypoglycemic protocol  Full code  Smoking cessation  CPAP     #DVT prophylaxis  Heparin infusion  SCD's     1/15: resting off bipap, doing ok has fluenza which is likely cause, troponins risking suspect type I    1/16: restart home meds, more awake today, upset over nursing home, cardiology recs revewied    1/17: no issues will contineu treatments, pending heart work up    1/19: Patient medically ready for discharge.  She will require a pre-CERT for this.  However, she is refusing to work with PT OT.         I spent 35 minutes in the professional and overall care of this patient.      John Laws, DO

## 2024-01-20 NOTE — PROGRESS NOTES
Occupational Therapy                 Therapy Communication Note    Patient Name: Jacinta Castrejon  MRN: [id number]  Today's Date: 1/20/2024     Discipline: Occupational Therapy    Missed Visit Reason: Pt. adamantly refusing therapy, agitated at staff, using profanity. RN aware.

## 2024-01-20 NOTE — PROGRESS NOTES
Physical Therapy                 Therapy Communication Note    Patient Name: Jacinta Csatrejon  MRN: [id number]  Today's Date: 1/20/2024     Discipline: Physical Therapy    Missed Visit Reason: Missed Visit Reason: Patient refused (Pt adamantly refusing any participation, agitated, yelling at staff. Nursing at bedside.Team aware.)    Missed Time: Attempt

## 2024-01-20 NOTE — PROGRESS NOTES
Jacinta Castrejon is a 64 y.o. female on day 5 of admission presenting with Shortness of breath.    Subjective   No events overnight.  Patient seen and examined at bedside.  She refused to work with therapy.  Importance of this for insurance to approve pre-CERT discussed with patient at length.  She is pleasant and cooperative with provider.  She ultimately agrees work with therapy.  However, notified by nursing that patient refused to work with therapy and cursed at staff.       Objective     Physical Exam  Constitutional:       Appearance: Normal appearance.   HENT:      Head: Normocephalic and atraumatic.      Right Ear: Tympanic membrane and ear canal normal.      Left Ear: Tympanic membrane and ear canal normal.      Mouth/Throat:      Mouth: Mucous membranes are moist.      Pharynx: Oropharynx is clear.   Eyes:      Extraocular Movements: Extraocular movements intact.      Conjunctiva/sclera: Conjunctivae normal.      Pupils: Pupils are equal, round, and reactive to light.   Cardiovascular:      Rate and Rhythm: Normal rate and regular rhythm.      Pulses: Normal pulses.      Heart sounds: Normal heart sounds.   Pulmonary:      Effort: Pulmonary effort is normal.      Breath sounds: Normal breath sounds.   Abdominal:      General: Abdomen is flat. Bowel sounds are normal.      Palpations: Abdomen is soft.   Musculoskeletal:         General: Normal range of motion.      Cervical back: Normal range of motion and neck supple.   Skin:     General: Skin is warm and dry.      Capillary Refill: Capillary refill takes 2 to 3 seconds.   Neurological:      General: No focal deficit present.      Mental Status: She is alert and oriented to person, place, and time. Mental status is at baseline.   Psychiatric:         Mood and Affect: Mood normal.         Behavior: Behavior normal.         Thought Content: Thought content normal.         Judgment: Judgment normal.         Last Recorded Vitals  Blood pressure 123/61, pulse 88,  "temperature 36 °C (96.8 °F), resp. rate 18, height 1.676 m (5' 5.98\"), weight (!) 177 kg (390 lb), SpO2 98 %.  Intake/Output last 3 Shifts:  I/O last 3 completed shifts:  In: - (0 mL/kg)   Out: 650 (3.7 mL/kg) [Urine:650 (0.1 mL/kg/hr)]  Weight: 176.9 kg     Relevant Results           This patient currently has cardiac telemetry ordered; if you would like to modify or discontinue the telemetry order, click here to go to the orders activity to modify/discontinue the order.                 Assessment/Plan   Active Problems:  There are no active Hospital Problems.    64 year old female with a past medical history of COPD chronically on 4 L oxygen, DIONICIO with CPAP, diabetes, hypertension, hyperlipidemia, heart failure, morbid obesity, who presented to [facility] ED today via EMS from nursing facility with shortness of breath.  Per ED physician, “Facility was reporting that patient was 30% on room air although we do believe this is likely to be inaccurate as she is still mentating appropriately.  EMS reported that she was in the 70s when they arrived.  They placed her on nonrebreather and she did improve into the mid to low 90s.”   She reports that she has been short of breath for a few days now. Cardiology consulted. Continue heparin, steroids, nebulizers, and BiPAP as needed. Patient to be admitted to hospital for further medical management.     #COPD Exacerbation on 4L continuous oxygen at home  #Acute on chronic respiratory failure with hypoxia and hypercapnia and supplemental oxygen requirement  #Influenza A+  #Shortness of breath  #Hx of DIONICIO  #Morbid obesity  Admit to inpatient/telemetry to Dr. Mahajan  See imaging results above  Continue BiPAP as needed. Wean BiPAP as able  Titrate oxygen to maintain sats >90%  Nebulizers  Solu-medrol 40mg IVP every 8 hours  Incentive spirometer  Droplet Isolation  UA ordered  Repeat labs in AM     #Suspect NSTEMI  #Elevated troponin   Consult cardiology and appreciate recs  Echocardiogram " 8/15/23: EF 60-65%  NPO until cleared by cardiology  Aspirin ordered  Troponin 49, 189. Will trend.  Initiate heparin infusion     #Acute on chronic anemia  H&H 10.7/34.9  Continue to monitor     Chronic issues  #DMII  #HTN  #HLD  #CHF  Continue home meds as appropriate when nurse completes home medication reconciliation  SSI with hypoglycemic protocol  Full code  Smoking cessation  CPAP     #DVT prophylaxis  Heparin infusion  SCD's     1/15: resting off bipap, doing ok has fluenza which is likely cause, troponins risking suspect type I    1/16: restart home meds, more awake today, upset over nursing home, cardiology recs revewied    1/17: no issues will contineu treatments, pending heart work up    1/19: Patient medically ready for discharge.  She will require a pre-CERT for this.  However, she is refusing to work with PT OT.    1/20: Medically ready for discharge to ECF.  Increase Lantus to 30 units daily.  Decrease Solu-Medrol to once daily.  Hopefully patient will discharge tomorrow.         I spent 35 minutes in the professional and overall care of this patient.      oJhn Laws, DO

## 2024-01-21 LAB
ANION GAP SERPL CALC-SCNC: 10 MMOL/L (ref 10–20)
BUN SERPL-MCNC: 21 MG/DL (ref 6–23)
CALCIUM SERPL-MCNC: 8.8 MG/DL (ref 8.6–10.3)
CHLORIDE SERPL-SCNC: 94 MMOL/L (ref 98–107)
CO2 SERPL-SCNC: 34 MMOL/L (ref 21–32)
CREAT SERPL-MCNC: 0.83 MG/DL (ref 0.5–1.05)
EGFRCR SERPLBLD CKD-EPI 2021: 79 ML/MIN/1.73M*2
ERYTHROCYTE [DISTWIDTH] IN BLOOD BY AUTOMATED COUNT: 15.4 % (ref 11.5–14.5)
GLUCOSE BLD MANUAL STRIP-MCNC: 326 MG/DL (ref 74–99)
GLUCOSE BLD MANUAL STRIP-MCNC: 346 MG/DL (ref 74–99)
GLUCOSE BLD MANUAL STRIP-MCNC: 380 MG/DL (ref 74–99)
GLUCOSE BLD MANUAL STRIP-MCNC: 418 MG/DL (ref 74–99)
GLUCOSE BLD MANUAL STRIP-MCNC: 444 MG/DL (ref 74–99)
GLUCOSE SERPL-MCNC: 388 MG/DL (ref 74–99)
HCT VFR BLD AUTO: 37.4 % (ref 36–46)
HGB BLD-MCNC: 11.8 G/DL (ref 12–16)
MCH RBC QN AUTO: 27 PG (ref 26–34)
MCHC RBC AUTO-ENTMCNC: 31.6 G/DL (ref 32–36)
MCV RBC AUTO: 86 FL (ref 80–100)
NRBC BLD-RTO: 0 /100 WBCS (ref 0–0)
PLATELET # BLD AUTO: 251 X10*3/UL (ref 150–450)
POTASSIUM SERPL-SCNC: 3.9 MMOL/L (ref 3.5–5.3)
RBC # BLD AUTO: 4.37 X10*6/UL (ref 4–5.2)
SODIUM SERPL-SCNC: 134 MMOL/L (ref 136–145)
WBC # BLD AUTO: 10.9 X10*3/UL (ref 4.4–11.3)

## 2024-01-21 PROCEDURE — 2500000001 HC RX 250 WO HCPCS SELF ADMINISTERED DRUGS (ALT 637 FOR MEDICARE OP): Performed by: NURSE PRACTITIONER

## 2024-01-21 PROCEDURE — 36415 COLL VENOUS BLD VENIPUNCTURE: CPT | Performed by: NURSE PRACTITIONER

## 2024-01-21 PROCEDURE — 97161 PT EVAL LOW COMPLEX 20 MIN: CPT | Mod: GP

## 2024-01-21 PROCEDURE — 2500000002 HC RX 250 W HCPCS SELF ADMINISTERED DRUGS (ALT 637 FOR MEDICARE OP, ALT 636 FOR OP/ED): Performed by: NURSE PRACTITIONER

## 2024-01-21 PROCEDURE — 99232 SBSQ HOSP IP/OBS MODERATE 35: CPT | Performed by: INTERNAL MEDICINE

## 2024-01-21 PROCEDURE — 2500000004 HC RX 250 GENERAL PHARMACY W/ HCPCS (ALT 636 FOR OP/ED): Performed by: NURSE PRACTITIONER

## 2024-01-21 PROCEDURE — 85027 COMPLETE CBC AUTOMATED: CPT | Performed by: NURSE PRACTITIONER

## 2024-01-21 PROCEDURE — 1100000001 HC PRIVATE ROOM DAILY

## 2024-01-21 PROCEDURE — 94640 AIRWAY INHALATION TREATMENT: CPT

## 2024-01-21 PROCEDURE — 97165 OT EVAL LOW COMPLEX 30 MIN: CPT | Mod: GO

## 2024-01-21 PROCEDURE — 2500000004 HC RX 250 GENERAL PHARMACY W/ HCPCS (ALT 636 FOR OP/ED): Performed by: INTERNAL MEDICINE

## 2024-01-21 PROCEDURE — 82947 ASSAY GLUCOSE BLOOD QUANT: CPT

## 2024-01-21 PROCEDURE — 82374 ASSAY BLOOD CARBON DIOXIDE: CPT | Performed by: INTERNAL MEDICINE

## 2024-01-21 PROCEDURE — 2500000002 HC RX 250 W HCPCS SELF ADMINISTERED DRUGS (ALT 637 FOR MEDICARE OP, ALT 636 FOR OP/ED): Performed by: STUDENT IN AN ORGANIZED HEALTH CARE EDUCATION/TRAINING PROGRAM

## 2024-01-21 PROCEDURE — 2500000002 HC RX 250 W HCPCS SELF ADMINISTERED DRUGS (ALT 637 FOR MEDICARE OP, ALT 636 FOR OP/ED): Performed by: INTERNAL MEDICINE

## 2024-01-21 RX ORDER — PREDNISONE 10 MG/1
TABLET ORAL
Qty: 30 TABLET | Refills: 0 | Status: SHIPPED | OUTPATIENT
Start: 2024-01-21 | End: 2024-02-02

## 2024-01-21 RX ADMIN — CYCLOBENZAPRINE 10 MG: 10 TABLET, FILM COATED ORAL at 00:53

## 2024-01-21 RX ADMIN — GUAIFENESIN 600 MG: 600 TABLET, EXTENDED RELEASE ORAL at 21:48

## 2024-01-21 RX ADMIN — BUDESONIDE 0.5 MG: 0.5 INHALANT ORAL at 07:10

## 2024-01-21 RX ADMIN — FORMOTEROL FUMARATE DIHYDRATE 20 MCG: 20 SOLUTION RESPIRATORY (INHALATION) at 07:16

## 2024-01-21 RX ADMIN — ASPIRIN 81 MG: 81 TABLET, CHEWABLE ORAL at 09:05

## 2024-01-21 RX ADMIN — INSULIN LISPRO 10 UNITS: 100 INJECTION, SOLUTION INTRAVENOUS; SUBCUTANEOUS at 18:07

## 2024-01-21 RX ADMIN — BUDESONIDE 0.5 MG: 0.5 INHALANT ORAL at 19:04

## 2024-01-21 RX ADMIN — INSULIN LISPRO 10 UNITS: 100 INJECTION, SOLUTION INTRAVENOUS; SUBCUTANEOUS at 09:10

## 2024-01-21 RX ADMIN — PREGABALIN 200 MG: 75 CAPSULE ORAL at 21:48

## 2024-01-21 RX ADMIN — POLYETHYLENE GLYCOL 3350 17 G: 17 POWDER, FOR SOLUTION ORAL at 09:05

## 2024-01-21 RX ADMIN — CAPSAICIN: 0.25 CREAM TOPICAL at 18:08

## 2024-01-21 RX ADMIN — CYCLOBENZAPRINE 10 MG: 10 TABLET, FILM COATED ORAL at 12:20

## 2024-01-21 RX ADMIN — SENNOSIDES 8.6 MG: 8.6 TABLET, FILM COATED ORAL at 09:05

## 2024-01-21 RX ADMIN — ALBUTEROL SULFATE 2.5 MG: 2.5 SOLUTION RESPIRATORY (INHALATION) at 04:07

## 2024-01-21 RX ADMIN — BUTALBITAL, ACETAMINOPHEN AND CAFFEINE 1 TABLET: 50; 325; 40 TABLET ORAL at 12:21

## 2024-01-21 RX ADMIN — ALBUTEROL SULFATE 2.5 MG: 2.5 SOLUTION RESPIRATORY (INHALATION) at 18:58

## 2024-01-21 RX ADMIN — SPIRONOLACTONE 12.5 MG: 25 TABLET, FILM COATED ORAL at 09:04

## 2024-01-21 RX ADMIN — PANTOPRAZOLE SODIUM 40 MG: 40 TABLET, DELAYED RELEASE ORAL at 06:40

## 2024-01-21 RX ADMIN — METHYLPREDNISOLONE SODIUM SUCCINATE 40 MG: 40 INJECTION, POWDER, FOR SOLUTION INTRAMUSCULAR; INTRAVENOUS at 06:41

## 2024-01-21 RX ADMIN — SENNOSIDES 8.6 MG: 8.6 TABLET, FILM COATED ORAL at 21:49

## 2024-01-21 RX ADMIN — MONTELUKAST 10 MG: 10 TABLET, FILM COATED ORAL at 21:49

## 2024-01-21 RX ADMIN — CAPSAICIN: 0.25 CREAM TOPICAL at 09:00

## 2024-01-21 RX ADMIN — FUROSEMIDE 40 MG: 40 TABLET ORAL at 09:04

## 2024-01-21 RX ADMIN — INSULIN LISPRO 10 UNITS: 100 INJECTION, SOLUTION INTRAVENOUS; SUBCUTANEOUS at 12:25

## 2024-01-21 RX ADMIN — ALBUTEROL SULFATE 2.5 MG: 2.5 SOLUTION RESPIRATORY (INHALATION) at 14:01

## 2024-01-21 RX ADMIN — PREGABALIN 200 MG: 75 CAPSULE ORAL at 09:04

## 2024-01-21 RX ADMIN — FAMOTIDINE 20 MG: 20 TABLET ORAL at 09:05

## 2024-01-21 RX ADMIN — OXYCODONE HYDROCHLORIDE AND ACETAMINOPHEN 1 TABLET: 5; 325 TABLET ORAL at 21:48

## 2024-01-21 RX ADMIN — GUAIFENESIN 600 MG: 600 TABLET, EXTENDED RELEASE ORAL at 09:04

## 2024-01-21 RX ADMIN — DULOXETINE HYDROCHLORIDE 60 MG: 30 CAPSULE, DELAYED RELEASE ORAL at 09:04

## 2024-01-21 RX ADMIN — ATORVASTATIN CALCIUM 40 MG: 40 TABLET, FILM COATED ORAL at 21:49

## 2024-01-21 RX ADMIN — CAPSAICIN: 0.25 CREAM TOPICAL at 21:00

## 2024-01-21 RX ADMIN — ALBUTEROL SULFATE 2.5 MG: 2.5 SOLUTION RESPIRATORY (INHALATION) at 07:10

## 2024-01-21 RX ADMIN — INSULIN GLARGINE 30 UNITS: 100 INJECTION, SOLUTION SUBCUTANEOUS at 09:05

## 2024-01-21 ASSESSMENT — COGNITIVE AND FUNCTIONAL STATUS - GENERAL
WALKING IN HOSPITAL ROOM: TOTAL
STANDING UP FROM CHAIR USING ARMS: A LITTLE
HELP NEEDED FOR BATHING: A LOT
MOVING FROM LYING ON BACK TO SITTING ON SIDE OF FLAT BED WITH BEDRAILS: A LOT
CLIMB 3 TO 5 STEPS WITH RAILING: TOTAL
MOBILITY SCORE: 12
TOILETING: TOTAL
DRESSING REGULAR LOWER BODY CLOTHING: TOTAL
DRESSING REGULAR UPPER BODY CLOTHING: A LOT
TURNING FROM BACK TO SIDE WHILE IN FLAT BAD: A LOT
MOVING TO AND FROM BED TO CHAIR: A LITTLE
DAILY ACTIVITIY SCORE: 14

## 2024-01-21 ASSESSMENT — PAIN - FUNCTIONAL ASSESSMENT
PAIN_FUNCTIONAL_ASSESSMENT: 0-10
PAIN_FUNCTIONAL_ASSESSMENT: 0-10

## 2024-01-21 ASSESSMENT — PAIN SCALES - GENERAL
PAINLEVEL_OUTOF10: 10 - WORST POSSIBLE PAIN
PAINLEVEL_OUTOF10: 10 - WORST POSSIBLE PAIN
PAINLEVEL_OUTOF10: 0 - NO PAIN
PAINLEVEL_OUTOF10: 0 - NO PAIN

## 2024-01-21 NOTE — PROGRESS NOTES
SW spoke with Attending regarding need for [facility] and LOC for pt insurance to show LTC need. Attending completed. [place] [facility] [place]. [place] confirmed [place] [facility] [place].    NORMA LOPEZ

## 2024-01-21 NOTE — CARE PLAN
The patient's goals for the shift include      The clinical goals for the shift include comfort    Progressing  Add All  Pain  My pain/discomfort is manageable  Add  Today at 0216 - Progressing by Emanuel Bahena LPN  Add  Safety  Patient will be injury free during hospitalization  Add  Today at 0216 - Progressing by Emanuel Bahena LPN  Add  I will remain free of falls  Add  Today at 0216 - Progressing by Emanuel Bahena LPN  Add  Daily Care  Daily care needs are met  Add  Today at 0216 - Progressing by Emanuel Bahena LPN  Add  Psychosocial Needs  Demonstrates ability to cope with hospitalization/illness  Add  Today at 0216 - Progressing by Emanuel Bahena LPN  Add  Collaborate with me, my family, and caregiver to identify my specific goals  Add  Today at 0216 - Progressing by Emanuel Bahena LPN  Add  Discharge Barriers  My discharge needs are met  Add  Today at 0216 - Progressing by Emanuel Bahena LPN  Add  Pain - Adult  Verbalizes/displays adequate comfort level or baseline comfort level  Add  Today at 0216 - Progressing by Emanuel Bahena LPN  Add  Safety - Adult  Free from fall injury  Add  Today at 0216 - Progressing by Emanuel Bahena LPN  Add  Discharge Planning  Discharge to home or other facility with appropriate resources  Add  Today at 0216 - Progressing by Emanuel Bahena LPN  Add  Chronic Conditions and Co-morbidities  Patient's chronic conditions and co-morbidity symptoms are monitored and maintained or improved  Add  Today at 0216 - Progressing by Emanuel Bahena LPN  Add  Skin  Decreased wound size/increased tissue granulation at next dressing change  Add  Today at 0216 - Progressing by Emanuel Bahena LPN  Add  Participates in plan/prevention/treatment measures  Add  Today at 0216 - Progressing by Emanuel Bahena LPN  Add  Prevent/manage excess moisture  Add  Today at 0216 - Progressing by Emanuel Bahena LPN  Add  Prevent/minimize sheer/friction injuries  Add  Today  at 0216 - Progressing by Emanuel Bahena LPN  Add  Promote/optimize nutrition  Add  Today at 0216 - Progressing by Emanuel Bahena LPN  Add  Promote skin healing  Add  Today at 0216 - Progressing by Emanuel Bahena LPN  Add  Pain  Takes deep breaths with improved pain control throughout the shift  Add  Today at 0216 - Progressing by Emanuel Bahena LPN  Add  Turns in bed with improved pain control throughout the shift  Add  Today at 0216 - Progressing by Emanuel Bahena LPN  Add  Walks with improved pain control throughout the shift  Add  Today at 0216 - Progressing by Emanuel Bahena LPN  Add  Performs ADL's with improved pain control throughout shift  Add  Today at 0216 - Progressing by Emanuel Bahena LPN  Add  Participates in PT with improved pain control throughout the shift  Add  Today at 0216 - Progressing by Emanuel Bahena LPN  Add  Free from opioid side effects throughout the shift  Add  Today at 0216 - Progressing by Emanuel Bahena LPN  Add  Free from acute confusion related to pain meds throughout the shift  Add  Today at 0216 - Progressing by Emanuel Bahena LPN

## 2024-01-21 NOTE — PROGRESS NOTES
Occupational Therapy    Evaluation    Patient Name: Jacinta Castrejon  MRN: [id number]  Today's Date: 1/21/2024  Time Calculation  Start Time: 1011  Stop Time: 1035  Time Calculation (min): 24 min        Assessment:  Prognosis: Fair  End of Session Communication: Bedside nurse  End of Session Patient Position: Bed, 3 rail up  Prognosis: Fair  Plan:  No Skilled OT: At baseline function  OT - OK to Discharge: Yes (from acute OT services to next level of care when medically cleared)       Subjective   Current Problem:  1. Shortness of breath  insulin glargine (Lantus) 100 unit/mL (3 mL) pen    oxyCODONE-acetaminophen (Percocet) 5-325 mg tablet    benzonatate (Tessalon) 100 mg capsule    guaiFENesin (Mucinex) 600 mg 12 hr tablet    oxygen (O2) gas therapy    predniSONE (Deltasone) 20 mg tablet      2. Acute respiratory failure with hypercapnia (CMS/HCC)  predniSONE (Deltasone) 10 mg tablet      3. Influenza        4. Other chest pain  Transthoracic Echo (TTE) Complete    Transthoracic Echo (TTE) Complete      5. NSTEMI (non-ST elevated myocardial infarction) (CMS/HCC)  Case Request Cath Lab: Left Heart Cath, With LV    Case Request Cath Lab: Left Heart Cath, With LV    Cardiac catheterization - coronary    Cardiac catheterization - coronary      6. Chest pain, unspecified  Transthoracic Echo (TTE) Complete        General:  General  Reason for Referral: impaired ADLs; patient to ED with fall, Flu A (+)  Referred By: Veto  Past Medical History Relevant to Rehab: COPD on 4L, DIONICIO on CPAP, DM, HTN, HLD, heart failure, morbid obesity, (+) smoker  Co-Treatment: PT  Co-Treatment Reason: to facilitate safety and activity tolerance  Prior to Session Communication: Bedside nurse  Patient Position Received: Bed, 2 rail up  General Comment: CXR: vascular congestion  Precautions:  Precautions Comment: Fall Precautions, Droplet Precautions    Pain:  Pain Assessment  Pain Assessment: 0-10  Pain Score: 0 - No pain    Objective    Cognition:  Overall Cognitive Status: Within Functional Limits           Home Living:  Home Living Comments: patient resides at [facility]; patient reporting that she has been requiring assist for ADLs, IADLs, transfers with close supervision via stand/pivot from EOB to wheelchair; patient reporting that she self propels with her feet backwards but is going to be getting a power wheelcare    ADL:  ADL Comments: Toileting: anticipate MAX A x2 at bed level;  UB Dressing: anticipate MOD A; LB Dressing: anticiapte MAX A x2 at bed level; Grooming: MOD I, seated; Feeding: MOD I, seated    Bed Mobility/Transfers: Bed Mobility  Bed Mobility: Yes  Bed Mobility 1  Bed Mobility Comments 1: patient declining to complete supine <> sit this date 2/2 pain when moving however rolling (R) <> (L) at SBA level with bed rail assist    Sensation:  Sensation Comment: declines numbness/tingling  Strength:  Strength Comments: (B) UE grossly 4/5 observed through functinal activity    Extremities: RUE   RUE : Within Functional Limits and LUE   LUE: Within Functional Limits      Outcome Measures:[facility] Daily Activity  Putting on and taking off regular lower body clothing: Total  Bathing (including washing, rinsing, drying): A lot  Putting on and taking off regular upper body clothing: A lot  Toileting, which includes using toilet, bedpan or urinal: Total  Taking care of personal grooming such as brushing teeth: None  Eating Meals: None  Daily Activity - Total Score: 14        Education Documentation  Body Mechanics, taught by Syeda John OT at 1/21/2024  2:06 PM.  Learner: Patient  Readiness: Acceptance  Method: Explanation  Response: Verbalizes Understanding    Precautions, taught by Syeda John OT at 1/21/2024  2:06 PM.  Learner: Patient  Readiness: Acceptance  Method: Explanation  Response: Verbalizes Understanding    ADL Training, taught by Syeda John OT at 1/21/2024  2:06 PM.  Learner: Patient  Readiness:  Acceptance  Method: Explanation  Response: Verbalizes Understanding    Education Comments  No comments found.        OP EDUCATION:  Education  Individual(s) Educated: Patient  Education Provided: Fall precautons, Ergonomics and postural realignment  Education Comment: will require continued education

## 2024-01-21 NOTE — PROGRESS NOTES
"Jacinta Castrejon is a 64 y.o. female on day 6 of admission presenting with Shortness of breath.    Subjective   No events overnight.  Patient seen and examined at bedside with PT. She is calm and cooperative today.        Objective     Physical Exam  Constitutional:       Appearance: Normal appearance.   HENT:      Head: Normocephalic and atraumatic.      Right Ear: Tympanic membrane and ear canal normal.      Left Ear: Tympanic membrane and ear canal normal.      Mouth/Throat:      Mouth: Mucous membranes are moist.      Pharynx: Oropharynx is clear.   Eyes:      Extraocular Movements: Extraocular movements intact.      Conjunctiva/sclera: Conjunctivae normal.      Pupils: Pupils are equal, round, and reactive to light.   Cardiovascular:      Rate and Rhythm: Normal rate and regular rhythm.      Pulses: Normal pulses.      Heart sounds: Normal heart sounds.   Pulmonary:      Effort: Pulmonary effort is normal.      Breath sounds: Normal breath sounds.   Abdominal:      General: Abdomen is flat. Bowel sounds are normal.      Palpations: Abdomen is soft.   Musculoskeletal:         General: Normal range of motion.      Cervical back: Normal range of motion and neck supple.   Skin:     General: Skin is warm and dry.      Capillary Refill: Capillary refill takes 2 to 3 seconds.   Neurological:      General: No focal deficit present.      Mental Status: She is alert and oriented to person, place, and time. Mental status is at baseline.   Psychiatric:         Mood and Affect: Mood normal.         Behavior: Behavior normal.         Thought Content: Thought content normal.         Judgment: Judgment normal.         Last Recorded Vitals  Blood pressure 115/69, pulse 85, temperature 36.2 °C (97.2 °F), temperature source Temporal, resp. rate 18, height 1.676 m (5' 5.98\"), weight (!) 177 kg (390 lb), SpO2 96 %.  Intake/Output last 3 Shifts:  No intake/output data recorded.    Relevant Results                       "       Assessment/Plan   Active Problems:  There are no active Hospital Problems.    64 year old female with a past medical history of COPD chronically on 4 L oxygen, DIONICIO with CPAP, diabetes, hypertension, hyperlipidemia, heart failure, morbid obesity, who presented to [facility] ED today via EMS from nursing facility with shortness of breath.  Per ED physician, “Facility was reporting that patient was 30% on room air although we do believe this is likely to be inaccurate as she is still mentating appropriately.  EMS reported that she was in the 70s when they arrived.  They placed her on nonrebreather and she did improve into the mid to low 90s.”   She reports that she has been short of breath for a few days now. Cardiology consulted. Continue heparin, steroids, nebulizers, and BiPAP as needed. Patient to be admitted to hospital for further medical management.     #COPD Exacerbation on 4L continuous oxygen at home  #Acute on chronic respiratory failure with hypoxia and hypercapnia and supplemental oxygen requirement  #Influenza A+  #Shortness of breath  #Hx of DIONICIO  #Morbid obesity  Admit to inpatient/telemetry to Dr. Mahajan  See imaging results above  Continue BiPAP as needed. Wean BiPAP as able  Titrate oxygen to maintain sats >90%  Nebulizers  Solu-medrol 40mg IVP every 8 hours  Incentive spirometer  Droplet Isolation  UA ordered  Repeat labs in AM     #Suspect NSTEMI  #Elevated troponin   Consult cardiology and appreciate recs  Echocardiogram 8/15/23: EF 60-65%  NPO until cleared by cardiology  Aspirin ordered  Troponin 49, 189. Will trend.  Initiate heparin infusion     #Acute on chronic anemia  H&H 10.7/34.9  Continue to monitor     Chronic issues  #DMII  #HTN  #HLD  #CHF  Continue home meds as appropriate when nurse completes home medication reconciliation  SSI with hypoglycemic protocol  Full code  Smoking cessation  CPAP     #DVT prophylaxis  Heparin infusion  SCD's     1/15: resting off bipap, doing ok has fluenza  which is likely cause, troponins risking suspect type I    1/16: restart home meds, more awake today, upset over nursing home, cardiology recs revewied    1/17: no issues will contineu treatments, pending heart work up    1/19: Patient medically ready for discharge.  She will require a pre-CERT for this.  However, she is refusing to work with PT OT.    1/20: Medically ready for discharge to ECF.  Increase Lantus to 30 units daily.  Decrease Solu-Medrol to once daily.  Hopefully patient will discharge tomorrow.    1/21: Patient remains medically ready for discharge. LOC paperwork signed.  Awaiting pre-CERT to return to ECF.  Flexeril for headache.         I spent 35 minutes in the professional and overall care of this patient.      John Laws, DO

## 2024-01-21 NOTE — PROGRESS NOTES
Physical Therapy    Physical Therapy    Physical Therapy Evaluation    Patient Name: Jacinta Castrejon  MRN: [id number]  Today's Date: 1/21/2024   Time Calculation  Start Time: 1010  Stop Time: 1035  Time Calculation (min): 25 min    Assessment/Plan   PT Assessment  PT Assessment Results: Pain  Rehab Prognosis: Fair  Evaluation/Treatment Tolerance: Patient tolerated treatment well  Medical Staff Made Aware: Yes  End of Session Communication: Bedside nurse  Assessment Comment: Pt appears to present near functional baseline, performing bed mobility activities /s hands on assist. Requires assist for ADL/IADLs at baseline. Pt declines any further therapy needs and goal is to return to [facility]. Will d/c from PT caseload at this time.  End of Session Patient Position: Bed, 3 rail up, Alarm off, not on at start of session  IP OR SWING BED PT PLAN  Inpatient or Swing Bed: Inpatient  PT Plan  PT Plan: PT Eval only  PT Eval Only Reason: At baseline function  PT Frequency: PT eval only  PT Discharge Recommendations: No further acute PT  PT - OK to Discharge: Yes    Subjective     Current Problem:  Patient Active Problem List   Diagnosis   (none) - all problems resolved or deleted       General Visit Information:  General  Reason for Referral: PT eval and treat  Referred By: Ice  Past Medical History Relevant to Rehab: COPD, DIONICIO, DM, HTN, HLD, MO  Prior to Session Communication: Bedside nurse  Patient Position Received: Bed, 3 rail up, Alarm off, not on at start of session  General Comment: Pt presents with c/o SOB, CP, possible NSTEMI and acute on chronic respiratory failure. Pt also found to be +FluA, droplet isolation in place. Pt cleared for therapy by nursing. Pt supine, agreeable.    Home Living:  Home Living  Home Living Comments: Pt resides at [facility], reports >1yr.    Prior Level of Function:  Prior Function Per Pt/Caregiver Report  Prior Function Comments: Pt states she likes to do things for herself, but does have staff /c her when  she transfers to her W/C. States only being able to propel W/C backwards /c her feet and is in process of getting a power W/C. Staff for ADL/IADL assist.    Precautions:  Precautions  Precautions Comment: falls, droplet isolation       Objective     Pain:  Pain Assessment  Pain Assessment: 0-10  Pain Score:  (no numeric stated, pt c/o neck/HA pain, MD aware.)    Cognition:  Cognition  Overall Cognitive Status: Within Functional Limits    General Assessments:  BLE AROM WFL, limited d/t body habitus  Strength  Strength Comments: BLE grossly 3-/5    Functional Assessments:     Bed Mobility  Bed Mobility: Yes  Bed Mobility 1  Bed Mobility Comments 1: Supine>long sit /c SBA, use of rails and momentum. Pt tolerates rolling L, R /c UE support and momentum; SBA. Pt declines further trial of EOB sitting, reports feeling near baseline. Mild SOB noted, recovers /c short rest period. 4L O2 in place.     Ambulation/Gait Training  Ambulation/Gait Training Performed: Yes          Outcome Measures:  [facility] Basic Mobility  Turning from your back to your side while in a flat bed without using bedrails: A lot  Moving from lying on your back to sitting on the side of a flat bed without using bedrails: A lot  Moving to and from bed to chair (including a wheelchair): A little  Standing up from a chair using your arms (e.g. wheelchair or bedside chair): A little  To walk in hospital room: Total  Climbing 3-5 steps with railing: Total  Basic Mobility - Total Score: 12

## 2024-01-22 LAB
ANION GAP SERPL CALC-SCNC: 10 MMOL/L (ref 10–20)
BUN SERPL-MCNC: 18 MG/DL (ref 6–23)
CALCIUM SERPL-MCNC: 8.5 MG/DL (ref 8.6–10.3)
CHLORIDE SERPL-SCNC: 98 MMOL/L (ref 98–107)
CO2 SERPL-SCNC: 33 MMOL/L (ref 21–32)
CREAT SERPL-MCNC: 0.59 MG/DL (ref 0.5–1.05)
EGFRCR SERPLBLD CKD-EPI 2021: >90 ML/MIN/1.73M*2
ERYTHROCYTE [DISTWIDTH] IN BLOOD BY AUTOMATED COUNT: 15.1 % (ref 11.5–14.5)
GLUCOSE BLD MANUAL STRIP-MCNC: 217 MG/DL (ref 74–99)
GLUCOSE BLD MANUAL STRIP-MCNC: 299 MG/DL (ref 74–99)
GLUCOSE BLD MANUAL STRIP-MCNC: 306 MG/DL (ref 74–99)
GLUCOSE BLD MANUAL STRIP-MCNC: 457 MG/DL (ref 74–99)
GLUCOSE SERPL-MCNC: 235 MG/DL (ref 74–99)
HCT VFR BLD AUTO: 37.4 % (ref 36–46)
HGB BLD-MCNC: 11.6 G/DL (ref 12–16)
MCH RBC QN AUTO: 26.7 PG (ref 26–34)
MCHC RBC AUTO-ENTMCNC: 31 G/DL (ref 32–36)
MCV RBC AUTO: 86 FL (ref 80–100)
NRBC BLD-RTO: 0 /100 WBCS (ref 0–0)
PLATELET # BLD AUTO: 255 X10*3/UL (ref 150–450)
POTASSIUM SERPL-SCNC: 3.9 MMOL/L (ref 3.5–5.3)
RBC # BLD AUTO: 4.35 X10*6/UL (ref 4–5.2)
SODIUM SERPL-SCNC: 137 MMOL/L (ref 136–145)
WBC # BLD AUTO: 11.5 X10*3/UL (ref 4.4–11.3)

## 2024-01-22 PROCEDURE — 2500000001 HC RX 250 WO HCPCS SELF ADMINISTERED DRUGS (ALT 637 FOR MEDICARE OP): Performed by: NURSE PRACTITIONER

## 2024-01-22 PROCEDURE — 94640 AIRWAY INHALATION TREATMENT: CPT

## 2024-01-22 PROCEDURE — 2500000004 HC RX 250 GENERAL PHARMACY W/ HCPCS (ALT 636 FOR OP/ED): Performed by: INTERNAL MEDICINE

## 2024-01-22 PROCEDURE — 2500000004 HC RX 250 GENERAL PHARMACY W/ HCPCS (ALT 636 FOR OP/ED): Performed by: NURSE PRACTITIONER

## 2024-01-22 PROCEDURE — 99238 HOSP IP/OBS DSCHRG MGMT 30/<: CPT | Performed by: STUDENT IN AN ORGANIZED HEALTH CARE EDUCATION/TRAINING PROGRAM

## 2024-01-22 PROCEDURE — 36415 COLL VENOUS BLD VENIPUNCTURE: CPT | Performed by: INTERNAL MEDICINE

## 2024-01-22 PROCEDURE — 82947 ASSAY GLUCOSE BLOOD QUANT: CPT

## 2024-01-22 PROCEDURE — 2500000002 HC RX 250 W HCPCS SELF ADMINISTERED DRUGS (ALT 637 FOR MEDICARE OP, ALT 636 FOR OP/ED): Performed by: STUDENT IN AN ORGANIZED HEALTH CARE EDUCATION/TRAINING PROGRAM

## 2024-01-22 PROCEDURE — 80048 BASIC METABOLIC PNL TOTAL CA: CPT | Performed by: INTERNAL MEDICINE

## 2024-01-22 PROCEDURE — 1100000001 HC PRIVATE ROOM DAILY

## 2024-01-22 PROCEDURE — 2500000002 HC RX 250 W HCPCS SELF ADMINISTERED DRUGS (ALT 637 FOR MEDICARE OP, ALT 636 FOR OP/ED): Performed by: INTERNAL MEDICINE

## 2024-01-22 PROCEDURE — 99223 1ST HOSP IP/OBS HIGH 75: CPT | Performed by: NURSE PRACTITIONER

## 2024-01-22 PROCEDURE — 2500000002 HC RX 250 W HCPCS SELF ADMINISTERED DRUGS (ALT 637 FOR MEDICARE OP, ALT 636 FOR OP/ED): Performed by: NURSE PRACTITIONER

## 2024-01-22 PROCEDURE — 85027 COMPLETE CBC AUTOMATED: CPT | Performed by: INTERNAL MEDICINE

## 2024-01-22 RX ORDER — INSULIN LISPRO 100 [IU]/ML
10 INJECTION, SOLUTION INTRAVENOUS; SUBCUTANEOUS ONCE
Status: COMPLETED | OUTPATIENT
Start: 2024-01-22 | End: 2024-01-22

## 2024-01-22 RX ADMIN — CYCLOBENZAPRINE 10 MG: 10 TABLET, FILM COATED ORAL at 12:11

## 2024-01-22 RX ADMIN — INSULIN LISPRO 4 UNITS: 100 INJECTION, SOLUTION INTRAVENOUS; SUBCUTANEOUS at 08:35

## 2024-01-22 RX ADMIN — ERGOCALCIFEROL 50000 UNITS: 1.25 CAPSULE ORAL at 08:42

## 2024-01-22 RX ADMIN — FUROSEMIDE 40 MG: 40 TABLET ORAL at 08:34

## 2024-01-22 RX ADMIN — METHYLPREDNISOLONE SODIUM SUCCINATE 40 MG: 40 INJECTION, POWDER, FOR SOLUTION INTRAMUSCULAR; INTRAVENOUS at 06:21

## 2024-01-22 RX ADMIN — PREGABALIN 200 MG: 75 CAPSULE ORAL at 08:34

## 2024-01-22 RX ADMIN — ALBUTEROL SULFATE 2.5 MG: 2.5 SOLUTION RESPIRATORY (INHALATION) at 12:09

## 2024-01-22 RX ADMIN — INSULIN LISPRO 10 UNITS: 100 INJECTION, SOLUTION INTRAVENOUS; SUBCUTANEOUS at 12:12

## 2024-01-22 RX ADMIN — CYCLOBENZAPRINE 10 MG: 10 TABLET, FILM COATED ORAL at 01:28

## 2024-01-22 RX ADMIN — CAPSAICIN: 0.25 CREAM TOPICAL at 22:15

## 2024-01-22 RX ADMIN — OXYCODONE HYDROCHLORIDE AND ACETAMINOPHEN 1 TABLET: 5; 325 TABLET ORAL at 18:48

## 2024-01-22 RX ADMIN — ALBUTEROL SULFATE 2.5 MG: 2.5 SOLUTION RESPIRATORY (INHALATION) at 06:49

## 2024-01-22 RX ADMIN — ATORVASTATIN CALCIUM 40 MG: 40 TABLET, FILM COATED ORAL at 22:14

## 2024-01-22 RX ADMIN — INSULIN LISPRO 6 UNITS: 100 INJECTION, SOLUTION INTRAVENOUS; SUBCUTANEOUS at 17:41

## 2024-01-22 RX ADMIN — PREGABALIN 200 MG: 75 CAPSULE ORAL at 22:13

## 2024-01-22 RX ADMIN — BUDESONIDE 0.5 MG: 0.5 INHALANT ORAL at 06:49

## 2024-01-22 RX ADMIN — GUAIFENESIN 600 MG: 600 TABLET, EXTENDED RELEASE ORAL at 22:13

## 2024-01-22 RX ADMIN — SPIRONOLACTONE 12.5 MG: 25 TABLET, FILM COATED ORAL at 08:34

## 2024-01-22 RX ADMIN — CAPSAICIN: 0.25 CREAM TOPICAL at 15:00

## 2024-01-22 RX ADMIN — FAMOTIDINE 20 MG: 20 TABLET ORAL at 08:34

## 2024-01-22 RX ADMIN — MONTELUKAST 10 MG: 10 TABLET, FILM COATED ORAL at 22:14

## 2024-01-22 RX ADMIN — INSULIN GLARGINE 30 UNITS: 100 INJECTION, SOLUTION SUBCUTANEOUS at 08:36

## 2024-01-22 RX ADMIN — SENNOSIDES 8.6 MG: 8.6 TABLET, FILM COATED ORAL at 22:14

## 2024-01-22 RX ADMIN — PANTOPRAZOLE SODIUM 40 MG: 40 TABLET, DELAYED RELEASE ORAL at 06:21

## 2024-01-22 RX ADMIN — OXYCODONE HYDROCHLORIDE AND ACETAMINOPHEN 1 TABLET: 5; 325 TABLET ORAL at 08:35

## 2024-01-22 RX ADMIN — DULOXETINE HYDROCHLORIDE 60 MG: 30 CAPSULE, DELAYED RELEASE ORAL at 08:34

## 2024-01-22 RX ADMIN — ASPIRIN 81 MG: 81 TABLET, CHEWABLE ORAL at 08:34

## 2024-01-22 ASSESSMENT — PAIN - FUNCTIONAL ASSESSMENT
PAIN_FUNCTIONAL_ASSESSMENT: 0-10

## 2024-01-22 ASSESSMENT — PAIN DESCRIPTION - LOCATION
LOCATION: NECK
LOCATION: OTHER (COMMENT)
LOCATION: NECK

## 2024-01-22 ASSESSMENT — COGNITIVE AND FUNCTIONAL STATUS - GENERAL
TOILETING: TOTAL
DRESSING REGULAR LOWER BODY CLOTHING: TOTAL
MOVING TO AND FROM BED TO CHAIR: A LITTLE
MOVING FROM LYING ON BACK TO SITTING ON SIDE OF FLAT BED WITH BEDRAILS: A LITTLE
HELP NEEDED FOR BATHING: A LOT
MOBILITY SCORE: 14
DAILY ACTIVITIY SCORE: 14
STANDING UP FROM CHAIR USING ARMS: A LITTLE
WALKING IN HOSPITAL ROOM: A LOT
TURNING FROM BACK TO SIDE WHILE IN FLAT BAD: A LOT
DRESSING REGULAR UPPER BODY CLOTHING: A LOT
CLIMB 3 TO 5 STEPS WITH RAILING: TOTAL

## 2024-01-22 ASSESSMENT — PAIN SCALES - GENERAL
PAINLEVEL_OUTOF10: 6
PAINLEVEL_OUTOF10: 7
PAINLEVEL_OUTOF10: 4
PAINLEVEL_OUTOF10: 7

## 2024-01-22 ASSESSMENT — PAIN DESCRIPTION - ORIENTATION: ORIENTATION: RIGHT

## 2024-01-22 NOTE — CARE PLAN
The patient's goals for the shift include      The clinical goals for the shift include Maintain comfort

## 2024-01-22 NOTE — CARE PLAN
The patient's goals for the shift include      The clinical goals for the shift include pt. comfort    Problem: Pain  Goal: My pain/discomfort is manageable  Outcome: Progressing     Problem: Safety  Goal: Patient will be injury free during hospitalization  Outcome: Progressing  Goal: I will remain free of falls  Outcome: Progressing     Problem: Daily Care  Goal: Daily care needs are met  Outcome: Progressing     Problem: Psychosocial Needs  Goal: Demonstrates ability to cope with hospitalization/illness  Outcome: Progressing  Goal: Collaborate with me, my family, and caregiver to identify my specific goals  Outcome: Progressing     Problem: Discharge Barriers  Goal: My discharge needs are met  Outcome: Progressing     Problem: Pain - Adult  Goal: Verbalizes/displays adequate comfort level or baseline comfort level  Outcome: Progressing     Problem: Safety - Adult  Goal: Free from fall injury  Outcome: Progressing     Problem: Discharge Planning  Goal: Discharge to home or other facility with appropriate resources  Outcome: Progressing     Problem: Chronic Conditions and Co-morbidities  Goal: Patient's chronic conditions and co-morbidity symptoms are monitored and maintained or improved  Outcome: Progressing     Problem: Skin  Goal: Decreased wound size/increased tissue granulation at next dressing change  Outcome: Progressing  Flowsheets (Taken 1/21/2024 1945)  Decreased wound size/increased tissue granulation at next dressing change: Promote sleep for wound healing  Goal: Participates in plan/prevention/treatment measures  Outcome: Progressing  Flowsheets (Taken 1/21/2024 1945)  Participates in plan/prevention/treatment measures:   Discuss with provider PT/OT consult   Elevate heels   Increase activity/out of bed for meals  Goal: Prevent/manage excess moisture  Outcome: Progressing  Flowsheets (Taken 1/21/2024 1945)  Prevent/manage excess moisture:   Cleanse incontinence/protect with barrier cream   Moisturize  dry skin  Goal: Prevent/minimize sheer/friction injuries  Outcome: Progressing  Flowsheets (Taken 1/21/2024 1945)  Prevent/minimize sheer/friction injuries:   Use pull sheet   HOB 30 degrees or less   Turn/reposition every 2 hours/use positioning/transfer devices  Goal: Promote/optimize nutrition  Outcome: Progressing  Flowsheets (Taken 1/21/2024 1945)  Promote/optimize nutrition: Consume > 50% meals/supplements  Goal: Promote skin healing  Outcome: Progressing  Flowsheets (Taken 1/21/2024 1945)  Promote skin healing: Turn/reposition every 2 hours/use positioning/transfer devices     Problem: Pain  Goal: Takes deep breaths with improved pain control throughout the shift  Outcome: Progressing  Goal: Turns in bed with improved pain control throughout the shift  Outcome: Progressing  Goal: Walks with improved pain control throughout the shift  Outcome: Progressing  Goal: Performs ADL's with improved pain control throughout shift  Outcome: Progressing  Goal: Participates in PT with improved pain control throughout the shift  Outcome: Progressing  Goal: Free from opioid side effects throughout the shift  Outcome: Progressing  Goal: Free from acute confusion related to pain meds throughout the shift  Outcome: Progressing

## 2024-01-22 NOTE — CONSULTS
"Psych Consult      Consult Requested By: Lonnie Mahajan    Reason for Consultation:  agitation    HISTORY OF PRESENT ILLNESS    Per ED - Jacinta Castrejon is a 64 y.o. female with a past medical history of COPD chronically on 4 L oxygen, DIONICIO with CPAP, diabetes, hypertension, hyperlipidemia, heart failure, morbid obesity, who presented to [facility] ED today via EMS from nursing facility with shortness of breath.  Per ED physician, “Facility was reporting that patient was 30% on room air although we do believe this is likely to be inaccurate as she is still mentating appropriately.  EMS reported that she was in the 70s when they arrived.  They placed her on nonrebreather and she did improve into the mid to low 90s.”   She reports that she has been short of breath for a few days now. States she has right chest pain that is worse with deep breaths, sore throat, ear pain, body aches. States she has a productive cough with brown sputum, denies hemoptysis.  She is uncertain of any fevers.  She supposedly received 1 nebulized treatment at facility as well as 1 in route by EMS for which they reported.      ED Course: Afebrile, /83, , RR 32, 95% on BiPAP. EKG unavailable for my review. ECG revealed sinus tachycardia at a rate of 118 beats per minute with TX interval 166 , QRS of 94 , QTc of 445.  No acute injury pattern.  Previous EKG on August 11, 2023 revealed no significant changes, per ED physician. Labs: H&H 10.7/34.9. Glucose 205. Chloride 96. BNP 85. Troponin 49, 189. Influenza A+. Covid-19 and RSV negative. Blood gas: pH 7.29, repeat 7.33, pCO2 74, repeat 66, pO2 25, repeat 37, sO2 27, repeat 59. See imaging results below. Duoneb, magnesium, and solumedrol given in ED.    HPI:  Patient is a 64-year-old -American female being seen today for agitation.  On approach patient is uncooperative with assessment.  States that she is not interested in taking any psychiatric medication.   \"I I have been on a multitude of " "psychoactive medication which ruined my life.  I am not interested in talking to you or hearing anything you have to say.  I appreciate that your just doing your job but there is nothing I need from you\"  Patient states that she has been on risperidone (among other medications she does not care to divulge) in the past for bipolar disorder - which pt states led to diabetes and an array of other medical issues.   Pt states that she has been to inpatient psych in the past but not for many years. She is not currently taking any psychoactive medication and is not interested in restarting anything at this time. Denies feeling depressed currently.  Pt states that she does not recall coming to the hospital and does not know what triggered a psych consult. She states that she may not have been cooperating initially due to confusion but currently she is doing \"fine\"  Patient speech is clear, rate and rhythm are appropriate. Her thoughts are organized.  Patient is calm and uncooperative. Attention and concentration are intact.  Pt does not appear to be internally stimulated.  Declines needing any psychiatric care nor does she want to speak about her psychiatric history.    PSYCHIATRIC REVIEW OF SYSTEMS  SI: Denies    Depression: Denies    Sharon: Denies    Panic: Denies    Generalized Anxiety: Denies    PTSD: Denies    OCD: Denies    Psychosis: Denies    Eating Disorder: Denies    Current Outpatient Medications   Medication Instructions    acetaminophen (Tylenol) 500 mg tablet 2 tablets, oral, Every 8 hours PRN    albuterol 2.5 mg, inhalation, Every 6 hours PRN    ammonium lactate (Lac-Hydrin) 12 % lotion 1 Application, Topical, As needed, BLE    aspirin 81 mg chewable tablet 1 tablet, oral, Daily    atorvastatin (LIPITOR) 40 mg, oral, Nightly    benzonatate (TESSALON) 100 mg, oral, 3 times daily PRN, Do not crush or chew.    bisacodyl (DULCOLAX) 10 mg, rectal, Daily PRN    budesonide (PULMICORT) 0.5 mg, inhalation, Every 12 " hours    butalbital-aspirin-caffeine (Fiorinal) -40 mg capsule 1 capsule, oral, Every 6 hours PRN    calcium carbonate (Tums) 200 mg calcium chewable tablet 1 tablet, oral, Every 12 hours PRN    capsicum (Zostrix) 0.075 % topical cream 1 Application, Topical, 3 times daily, Bilateral knees/shoulders    chlorhexidine (Peridex) 0.12 % solution 15 mL, mucous membrane, Daily    cyclobenzaprine (Flexeril) 10 mg tablet 1 tablet, oral, Every 12 hours    diclofenac sodium 1 % kit 1 Application, Topical, 4 times daily    docusate sodium (COLACE) 100 mg, oral, 2 times daily, Hold for loose stools    DULoxetine (CYMBALTA) 60 mg, oral, Daily    ergocalciferol (VITAMIN D-2) 50,000 Units, oral, 2 times weekly, Every Monday and Thursday    famotidine (PEPCID) 20 mg, oral, Daily    formoterol (PERFOROMIST) 20 mcg, inhalation, Every 12 hours    furosemide (LASIX) 40 mg, oral, Daily    guaiFENesin (MUCINEX) 600 mg, oral, 2 times daily, Do not crush, chew, or split.    hydrocortisone 1 % cream 1 Application, Topical, Every 12 hours PRN    insulin glargine (LANTUS) 29 Units, subcutaneous, 2 times daily    insulin lispro (HumaLOG KwikPen Insulin) 100 unit/mL injection subcutaneous, 3 times daily before meals, Per sliding scale    insulin lispro (HUMALOG) 10 Units, subcutaneous, 3 times daily with meals    LORazepam (ATIVAN) 1 mg, oral, Every 8 hours PRN    mirabegron (Myrbetriq) 25 mg tablet extended release 24 hr 24 hr tablet 1 tablet, oral, Daily    montelukast (SINGULAIR) 10 mg, oral, Nightly    neomycin-bacitracnZn-polymyxnB (Triple Antibiotic) ointment 1 Application, Topical, As needed, Under Bilateral Breasts    nitroglycerin (NITROSTAT) 0.4 mg, sublingual, Every 5 min PRN    omeprazole (PRILOSEC) 20 mg, oral, Daily before breakfast    oxyCODONE-acetaminophen (Percocet) 5-325 mg tablet 2 tablets, oral, Every 6 hours    oxyCODONE-acetaminophen (Percocet) 5-325 mg tablet 1 tablet, oral, Every 6 hours PRN    oxygen (O2) 4  L/min, inhalation, Every 24 hours    polyethylene glycol (GLYCOLAX, MIRALAX) 17 g, oral, Daily    predniSONE (Deltasone) 10 mg tablet Take 4 tablets (40 mg) by mouth once daily for 3 days, THEN 3 tablets (30 mg) once daily for 3 days, THEN 2 tablets (20 mg) once daily for 3 days, THEN 1 tablet (10 mg) once daily for 3 days.    predniSONE (Deltasone) 20 mg tablet Take 3 tabs (60mg) daily for 3 days, then take 2 tabs (40mg) daily for 3 days, then take 1 tab (20mg) daily for 3 days.    pregabalin (LYRICA) 200 mg, oral, 2 times daily    sennosides (Senokot) 8.6 mg tablet 1 tablet, oral, 2 times daily    spironolactone (ALDACTONE) 12.5 mg, oral, Daily        OARRS:   220    No past medical history on file.     Past Surgical History:   Procedure Laterality Date    CARDIAC CATHETERIZATION N/A 1/17/2024    Procedure: Left Heart Cath, With LV;  Surgeon: Chaitanya Adrian MD;  Location: [facility] Cardiac Cath Lab;  Service: Cardiovascular;  Laterality: N/A;        No family history on file.     Social History     Substance and Sexual Activity   Alcohol Use Not on file        Social History     Substance and Sexual Activity   Drug Use Not on file        Social History     Tobacco Use   Smoking Status Former    Types: Cigarettes   Smokeless Tobacco Never        MENTAL STATUS EXAM  Physical Exam  Psychiatric:         Attention and Perception: Attention normal.         Mood and Affect: Affect normal. Mood is depressed.         Speech: Speech normal.         Behavior: Behavior is uncooperative.         Thought Content: Thought content normal.         Cognition and Memory: Cognition and memory normal.         Judgment: Judgment normal.         Vitals:    01/22/24 0329 01/22/24 0649 01/22/24 0820 01/22/24 1209   BP: 123/62  112/60    BP Location: Right arm  Right arm    Patient Position: Lying  Lying    Pulse: 86  91    Resp: 18  18    Temp: 35.2 °C (95.4 °F)  35.7 °C (96.3 °F)    TempSrc: Temporal  Temporal    SpO2: 100% 98% 95% 98%    Weight:       Height:            Recent Results (from the past 72 hour(s))   POCT GLUCOSE    Collection Time: 01/19/24  4:14 PM   Result Value Ref Range    POCT Glucose 407 (H) 74 - 99 mg/dL   POCT GLUCOSE    Collection Time: 01/19/24  8:42 PM   Result Value Ref Range    POCT Glucose 373 (H) 74 - 99 mg/dL   POCT GLUCOSE    Collection Time: 01/20/24  3:44 AM   Result Value Ref Range    POCT Glucose 386 (H) 74 - 99 mg/dL   CBC    Collection Time: 01/20/24  5:23 AM   Result Value Ref Range    WBC 10.9 4.4 - 11.3 x10*3/uL    nRBC 0.0 0.0 - 0.0 /100 WBCs    RBC 4.40 4.00 - 5.20 x10*6/uL    Hemoglobin 11.7 (L) 12.0 - 16.0 g/dL    Hematocrit 37.7 36.0 - 46.0 %    MCV 86 80 - 100 fL    MCH 26.6 26.0 - 34.0 pg    MCHC 31.0 (L) 32.0 - 36.0 g/dL    RDW 15.2 (H) 11.5 - 14.5 %    Platelets 260 150 - 450 x10*3/uL   Basic Metabolic Panel    Collection Time: 01/20/24  5:23 AM   Result Value Ref Range    Glucose 397 (H) 74 - 99 mg/dL    Sodium 134 (L) 136 - 145 mmol/L    Potassium 4.6 3.5 - 5.3 mmol/L    Chloride 95 (L) 98 - 107 mmol/L    Bicarbonate 32 21 - 32 mmol/L    Anion Gap 12 10 - 20 mmol/L    Urea Nitrogen 22 6 - 23 mg/dL    Creatinine 0.72 0.50 - 1.05 mg/dL    eGFR >90 >60 mL/min/1.73m*2    Calcium 9.2 8.6 - 10.3 mg/dL   POCT GLUCOSE    Collection Time: 01/20/24  6:04 AM   Result Value Ref Range    POCT Glucose 357 (H) 74 - 99 mg/dL   POCT GLUCOSE    Collection Time: 01/20/24 11:45 AM   Result Value Ref Range    POCT Glucose 376 (H) 74 - 99 mg/dL   POCT GLUCOSE    Collection Time: 01/20/24  3:38 PM   Result Value Ref Range    POCT Glucose 353 (H) 74 - 99 mg/dL   POCT GLUCOSE    Collection Time: 01/21/24  6:04 AM   Result Value Ref Range    POCT Glucose 380 (H) 74 - 99 mg/dL   CBC    Collection Time: 01/21/24  7:01 AM   Result Value Ref Range    WBC 10.9 4.4 - 11.3 x10*3/uL    nRBC 0.0 0.0 - 0.0 /100 WBCs    RBC 4.37 4.00 - 5.20 x10*6/uL    Hemoglobin 11.8 (L) 12.0 - 16.0 g/dL    Hematocrit 37.4 36.0 - 46.0 %    MCV 86 80  - 100 fL    MCH 27.0 26.0 - 34.0 pg    MCHC 31.6 (L) 32.0 - 36.0 g/dL    RDW 15.4 (H) 11.5 - 14.5 %    Platelets 251 150 - 450 x10*3/uL   Basic Metabolic Panel    Collection Time: 01/21/24  7:01 AM   Result Value Ref Range    Glucose 388 (H) 74 - 99 mg/dL    Sodium 134 (L) 136 - 145 mmol/L    Potassium 3.9 3.5 - 5.3 mmol/L    Chloride 94 (L) 98 - 107 mmol/L    Bicarbonate 34 (H) 21 - 32 mmol/L    Anion Gap 10 10 - 20 mmol/L    Urea Nitrogen 21 6 - 23 mg/dL    Creatinine 0.83 0.50 - 1.05 mg/dL    eGFR 79 >60 mL/min/1.73m*2    Calcium 8.8 8.6 - 10.3 mg/dL   POCT GLUCOSE    Collection Time: 01/21/24 11:24 AM   Result Value Ref Range    POCT Glucose 418 (H) 74 - 99 mg/dL   POCT GLUCOSE    Collection Time: 01/21/24 12:21 PM   Result Value Ref Range    POCT Glucose 444 (H) 74 - 99 mg/dL   POCT GLUCOSE    Collection Time: 01/21/24  5:20 PM   Result Value Ref Range    POCT Glucose 346 (H) 74 - 99 mg/dL   POCT GLUCOSE    Collection Time: 01/21/24  8:11 PM   Result Value Ref Range    POCT Glucose 326 (H) 74 - 99 mg/dL   POCT GLUCOSE    Collection Time: 01/22/24  6:00 AM   Result Value Ref Range    POCT Glucose 217 (H) 74 - 99 mg/dL   CBC    Collection Time: 01/22/24  6:36 AM   Result Value Ref Range    WBC 11.5 (H) 4.4 - 11.3 x10*3/uL    nRBC 0.0 0.0 - 0.0 /100 WBCs    RBC 4.35 4.00 - 5.20 x10*6/uL    Hemoglobin 11.6 (L) 12.0 - 16.0 g/dL    Hematocrit 37.4 36.0 - 46.0 %    MCV 86 80 - 100 fL    MCH 26.7 26.0 - 34.0 pg    MCHC 31.0 (L) 32.0 - 36.0 g/dL    RDW 15.1 (H) 11.5 - 14.5 %    Platelets 255 150 - 450 x10*3/uL   Basic Metabolic Panel    Collection Time: 01/22/24  6:36 AM   Result Value Ref Range    Glucose 235 (H) 74 - 99 mg/dL    Sodium 137 136 - 145 mmol/L    Potassium 3.9 3.5 - 5.3 mmol/L    Chloride 98 98 - 107 mmol/L    Bicarbonate 33 (H) 21 - 32 mmol/L    Anion Gap 10 10 - 20 mmol/L    Urea Nitrogen 18 6 - 23 mg/dL    Creatinine 0.59 0.50 - 1.05 mg/dL    eGFR >90 >60 mL/min/1.73m*2    Calcium 8.5 (L) 8.6 - 10.3  mg/dL   POCT GLUCOSE    Collection Time: 01/22/24 11:39 AM   Result Value Ref Range    POCT Glucose 457 (H) 74 - 99 mg/dL        No results found.     ASSESSMENT AND PLAN  DX: Bipolar Disorder, MDD  PLAN: Declines any psychiatric care at this time  Pt appears to be at her baseline  Plan is for pt to return to SNF    Thank you for allowing us to participate in the care of this patient. We will sign off at this time.  .    I spent 60 minutes in the professional and overall care of this patient.      Mynor Calderon, APRN-CNP

## 2024-01-22 NOTE — DISCHARGE SUMMARY
Discharge Diagnosis  Shortness of breath    Issues Requiring Follow-Up  Flu    Test Results Pending At Discharge  Pending Labs       No current pending labs.            Hospital Course  64 year old female with a past medical history of COPD chronically on 4 L oxygen, DIONICIO with CPAP, diabetes, hypertension, hyperlipidemia, heart failure, morbid obesity, [facility] facility with shortness of breath.  Per ED physician, “Facility was reporting that patient was 30% on room air although we do believe this is likely to be inaccurate as she is still mentating appropriately.  EMS reported that she was in the 70s when they arrived.  They placed her on nonrebreather and she did improve into the mid to low 90s.”   She reports that she has been short of breath for a few days now. Cardiology consulted. Continue heparin, steroids, nebulizers, and BiPAP as needed. Patient to be admitted to hospital for further medical management.     #COPD Exacerbation on 4L continuous oxygen at home  #Acute on chronic respiratory failure with hypoxia and hypercapnia and supplemental oxygen requirement  #Influenza A+  #Shortness of breath  #Hx of DIONICIO  #Morbid obesity  Admit to inpatient/telemetry to Dr. Mahajan  See imaging results above  Continue BiPAP as needed. Wean BiPAP as able  Titrate oxygen to maintain sats >90%  Nebulizers  Solu-medrol 40mg IVP every 8 hours  Incentive spirometer  Droplet Isolation  UA ordered  Repeat labs in AM     #Suspect NSTEMI  #Elevated troponin   Consult cardiology and appreciate recs  Echocardiogram 8/15/23: EF 60-65%  NPO until cleared by cardiology  Aspirin ordered  Troponin 49, 189. Will trend.  Initiate heparin infusion     #Acute on chronic anemia  H&H 10.7/34.9  Continue to monitor     Chronic issues  #DMII  #HTN  #HLD  #CHF  Continue home meds as appropriate when nurse completes home medication reconciliation  SSI with hypoglycemic protocol  Full code  Smoking  cessation  CPAP     #DVT prophylaxis  Heparin infusion  SCD's     1/15: resting off bipap, doing ok has fluenza which is likely cause, troponins risking suspect type I     1/16: restart home meds, more awake today, upset over nursing home, cardiology recs revewied     1/17: no issues will contineu treatments, pending heart work up     1/19: Patient medically ready for discharge.  She will require a pre-CERT for this.  However, she is refusing to work with PT OT.     1/20: Medically ready for discharge to ECF.  Increase Lantus to 30 units daily.  Decrease Solu-Medrol to once daily.  Hopefully patient will discharge tomorrow.     1/21: Patient remains medically ready for discharge. LOC paperwork signed.  Awaiting pre-CERT to return to ECF.  Flexeril for headache.     Cleared to go, mikaela precert  Pertinent Physical Exam At Time of Discharge  Physical Exam  Constitutional:       Appearance: Normal appearance.   HENT:      Head: Normocephalic and atraumatic.      Right Ear: Tympanic membrane and ear canal normal.      Left Ear: Tympanic membrane and ear canal normal.      Mouth/Throat:      Mouth: Mucous membranes are moist.      Pharynx: Oropharynx is clear.   Eyes:      Extraocular Movements: Extraocular movements intact.      Conjunctiva/sclera: Conjunctivae normal.      Pupils: Pupils are equal, round, and reactive to light.   Cardiovascular:      Rate and Rhythm: Normal rate and regular rhythm.      Pulses: Normal pulses.      Heart sounds: Normal heart sounds.   Pulmonary:      Effort: Pulmonary effort is normal.      Breath sounds: Normal breath sounds.   Abdominal:      General: Abdomen is flat. Bowel sounds are normal.      Palpations: Abdomen is soft.   Musculoskeletal:         General: Normal range of motion.      Cervical back: Normal range of motion and neck supple.   Skin:     General: Skin is warm and dry.      Capillary Refill: Capillary refill takes 2 to 3 seconds.   Neurological:      General: No  focal deficit present.      Mental Status: She is alert and oriented to person, place, and time. Mental status is at baseline.   Psychiatric:         Mood and Affect: Mood normal.         Behavior: Behavior normal.         Thought Content: Thought content normal.         Judgment: Judgment normal.         Home Medications     Medication List      START taking these medications     benzonatate 100 mg capsule; Commonly known as: Tessalon; Take 1 capsule   (100 mg) by mouth 3 times a day as needed for cough. Do not crush or chew.   guaiFENesin 600 mg 12 hr tablet; Commonly known as: Mucinex; Take 1   tablet (600 mg) by mouth 2 times a day. Do not crush, chew, or split.   oxygen gas therapy; Commonly known as: O2; Inhale 4 L/min once every 24   hours.   * predniSONE 20 mg tablet; Commonly known as: Deltasone; Take 3 tabs   (60mg) daily for 3 days, then take 2 tabs (40mg) daily for 3 days, then   take 1 tab (20mg) daily for 3 days.   * predniSONE 10 mg tablet; Commonly known as: Deltasone; Take 4 tablets   (40 mg) by mouth once daily for 3 days, THEN 3 tablets (30 mg) once daily   for 3 days, THEN 2 tablets (20 mg) once daily for 3 days, THEN 1 tablet   (10 mg) once daily for 3 days.; Start taking on: January 21, 2024  * This list has 2 medication(s) that are the same as other medications   prescribed for you. Read the directions carefully, and ask your doctor or   other care provider to review them with you.     CHANGE how you take these medications     insulin glargine 100 unit/mL (3 mL) pen; Commonly known as: Lantus;   Inject 29 Units under the skin 2 times a day.; What changed: how much to   take, when to take this   oxyCODONE-acetaminophen 5-325 mg tablet; Commonly known as: Percocet;   Take 1 tablet by mouth every 6 hours if needed for severe pain (7 - 10).;   What changed: how much to take, when to take this, reasons to take this     CONTINUE taking these medications     acetaminophen 500 mg tablet; Commonly  known as: Tylenol   albuterol 2.5 mg /3 mL (0.083 %) nebulizer solution   ammonium lactate 12 % lotion; Commonly known as: Lac-Hydrin   aspirin 81 mg chewable tablet   atorvastatin 40 mg tablet; Commonly known as: Lipitor   bisacodyl 10 mg suppository; Commonly known as: Dulcolax   budesonide 0.5 mg/2 mL nebulizer solution; Commonly known as: Pulmicort   butalbital-aspirin-caffeine -40 mg capsule; Commonly known as:   Fiorinal   calcium carbonate 200 mg calcium chewable tablet; Commonly known as:   Tums   capsicum 0.075 % topical cream; Commonly known as: Zostrix   chlorhexidine 0.12 % solution; Commonly known as: Peridex   cyclobenzaprine 10 mg tablet; Commonly known as: Flexeril   diclofenac sodium 1 % kit   docusate sodium 100 mg capsule; Commonly known as: Colace   DULoxetine 60 mg DR capsule; Commonly known as: Cymbalta   ergocalciferol 1.25 MG (51017 UT) capsule; Commonly known as: Vitamin   D-2   famotidine 20 mg tablet; Commonly known as: Pepcid   formoterol 20 mcg/2 mL nebulizer solution; Commonly known as:   Perforomist   furosemide 40 mg tablet; Commonly known as: Lasix   hydrocortisone 1 % cream   * insulin lispro 100 unit/mL injection; Commonly known as: HumaLOG   * HumaLOG KwikPen Insulin 100 unit/mL injection; Generic drug: insulin   lispro   LORazepam 1 mg tablet; Commonly known as: Ativan   montelukast 10 mg tablet; Commonly known as: Singulair   Myrbetriq 25 mg tablet extended release 24 hr 24 hr tablet; Generic   drug: mirabegron   nitroglycerin 0.4 mg SL tablet; Commonly known as: Nitrostat   omeprazole 20 mg DR capsule; Commonly known as: PriLOSEC   polyethylene glycol 17 gram packet; Commonly known as: Glycolax, Miralax   pregabalin 200 mg capsule; Commonly known as: Lyrica   sennosides 8.6 mg tablet; Commonly known as: Senokot   spironolactone 25 mg tablet; Commonly known as: Aldactone   Triple Antibiotic ointment; Generic drug: neomycin-bacitracnZn-polymyxnB  * This list has 2  medication(s) that are the same as other medications   prescribed for you. Read the directions carefully, and ask your doctor or   other care provider to review them with you.       Outpatient Follow-Up  No future appointments.    Lonnie Mahajan, DO

## 2024-01-22 NOTE — PROGRESS NOTES
This SW messaged [facility] asking for update on precert to return. SW will await response and continue to follow.  UPDATE 3:11p  SW was notified that the LTC auth was still pending. Sw asked therapy dept for updated notes if needed for precert. SW will continue to follow.  ADELA RON, NORMA

## 2024-01-23 VITALS
TEMPERATURE: 98.8 F | RESPIRATION RATE: 18 BRPM | BODY MASS INDEX: 47.09 KG/M2 | HEART RATE: 96 BPM | HEIGHT: 66 IN | DIASTOLIC BLOOD PRESSURE: 76 MMHG | WEIGHT: 293 LBS | OXYGEN SATURATION: 97 % | SYSTOLIC BLOOD PRESSURE: 120 MMHG

## 2024-01-23 LAB
GLUCOSE BLD MANUAL STRIP-MCNC: 157 MG/DL (ref 74–99)
GLUCOSE BLD MANUAL STRIP-MCNC: 304 MG/DL (ref 74–99)
GLUCOSE BLD MANUAL STRIP-MCNC: 321 MG/DL (ref 74–99)
GLUCOSE BLD MANUAL STRIP-MCNC: 337 MG/DL (ref 74–99)
GLUCOSE BLD MANUAL STRIP-MCNC: 344 MG/DL (ref 74–99)

## 2024-01-23 PROCEDURE — 2500000002 HC RX 250 W HCPCS SELF ADMINISTERED DRUGS (ALT 637 FOR MEDICARE OP, ALT 636 FOR OP/ED): Performed by: INTERNAL MEDICINE

## 2024-01-23 PROCEDURE — 1100000001 HC PRIVATE ROOM DAILY

## 2024-01-23 PROCEDURE — 94640 AIRWAY INHALATION TREATMENT: CPT

## 2024-01-23 PROCEDURE — 2500000002 HC RX 250 W HCPCS SELF ADMINISTERED DRUGS (ALT 637 FOR MEDICARE OP, ALT 636 FOR OP/ED): Performed by: STUDENT IN AN ORGANIZED HEALTH CARE EDUCATION/TRAINING PROGRAM

## 2024-01-23 PROCEDURE — 2500000002 HC RX 250 W HCPCS SELF ADMINISTERED DRUGS (ALT 637 FOR MEDICARE OP, ALT 636 FOR OP/ED): Performed by: NURSE PRACTITIONER

## 2024-01-23 PROCEDURE — 2500000001 HC RX 250 WO HCPCS SELF ADMINISTERED DRUGS (ALT 637 FOR MEDICARE OP): Performed by: NURSE PRACTITIONER

## 2024-01-23 PROCEDURE — 2500000004 HC RX 250 GENERAL PHARMACY W/ HCPCS (ALT 636 FOR OP/ED): Performed by: NURSE PRACTITIONER

## 2024-01-23 PROCEDURE — 2500000004 HC RX 250 GENERAL PHARMACY W/ HCPCS (ALT 636 FOR OP/ED): Performed by: INTERNAL MEDICINE

## 2024-01-23 PROCEDURE — 2500000004 HC RX 250 GENERAL PHARMACY W/ HCPCS (ALT 636 FOR OP/ED): Performed by: STUDENT IN AN ORGANIZED HEALTH CARE EDUCATION/TRAINING PROGRAM

## 2024-01-23 PROCEDURE — 82947 ASSAY GLUCOSE BLOOD QUANT: CPT

## 2024-01-23 RX ADMIN — ATORVASTATIN CALCIUM 40 MG: 40 TABLET, FILM COATED ORAL at 21:52

## 2024-01-23 RX ADMIN — INSULIN LISPRO 2 UNITS: 100 INJECTION, SOLUTION INTRAVENOUS; SUBCUTANEOUS at 08:52

## 2024-01-23 RX ADMIN — ALBUTEROL SULFATE 2.5 MG: 2.5 SOLUTION RESPIRATORY (INHALATION) at 06:32

## 2024-01-23 RX ADMIN — CAPSAICIN: 0.25 CREAM TOPICAL at 08:58

## 2024-01-23 RX ADMIN — PREGABALIN 200 MG: 75 CAPSULE ORAL at 21:52

## 2024-01-23 RX ADMIN — GUAIFENESIN 600 MG: 600 TABLET, EXTENDED RELEASE ORAL at 21:51

## 2024-01-23 RX ADMIN — FORMOTEROL FUMARATE DIHYDRATE 20 MCG: 20 SOLUTION RESPIRATORY (INHALATION) at 06:32

## 2024-01-23 RX ADMIN — MONTELUKAST 10 MG: 10 TABLET, FILM COATED ORAL at 21:52

## 2024-01-23 RX ADMIN — CYCLOBENZAPRINE 10 MG: 10 TABLET, FILM COATED ORAL at 12:21

## 2024-01-23 RX ADMIN — CYCLOBENZAPRINE 10 MG: 10 TABLET, FILM COATED ORAL at 00:34

## 2024-01-23 RX ADMIN — FUROSEMIDE 40 MG: 40 TABLET ORAL at 08:51

## 2024-01-23 RX ADMIN — ASPIRIN 81 MG: 81 TABLET, CHEWABLE ORAL at 08:51

## 2024-01-23 RX ADMIN — INSULIN GLARGINE 30 UNITS: 100 INJECTION, SOLUTION SUBCUTANEOUS at 01:58

## 2024-01-23 RX ADMIN — INSULIN LISPRO 10 UNITS: 100 INJECTION, SOLUTION INTRAVENOUS; SUBCUTANEOUS at 12:21

## 2024-01-23 RX ADMIN — PREGABALIN 200 MG: 75 CAPSULE ORAL at 08:51

## 2024-01-23 RX ADMIN — BUDESONIDE 0.5 MG: 0.5 INHALANT ORAL at 20:25

## 2024-01-23 RX ADMIN — ALBUTEROL SULFATE 2.5 MG: 2.5 SOLUTION RESPIRATORY (INHALATION) at 20:24

## 2024-01-23 RX ADMIN — PANTOPRAZOLE SODIUM 40 MG: 40 TABLET, DELAYED RELEASE ORAL at 06:43

## 2024-01-23 RX ADMIN — DULOXETINE HYDROCHLORIDE 60 MG: 30 CAPSULE, DELAYED RELEASE ORAL at 08:51

## 2024-01-23 RX ADMIN — BUDESONIDE 0.5 MG: 0.5 INHALANT ORAL at 06:33

## 2024-01-23 RX ADMIN — FAMOTIDINE 20 MG: 20 TABLET ORAL at 08:51

## 2024-01-23 RX ADMIN — FORMOTEROL FUMARATE DIHYDRATE 20 MCG: 20 SOLUTION RESPIRATORY (INHALATION) at 20:20

## 2024-01-23 RX ADMIN — INSULIN GLARGINE 30 UNITS: 100 INJECTION, SOLUTION SUBCUTANEOUS at 08:52

## 2024-01-23 RX ADMIN — SPIRONOLACTONE 12.5 MG: 25 TABLET, FILM COATED ORAL at 08:51

## 2024-01-23 RX ADMIN — OXYCODONE HYDROCHLORIDE AND ACETAMINOPHEN 1 TABLET: 5; 325 TABLET ORAL at 06:43

## 2024-01-23 RX ADMIN — OXYCODONE HYDROCHLORIDE AND ACETAMINOPHEN 1 TABLET: 5; 325 TABLET ORAL at 22:43

## 2024-01-23 RX ADMIN — GUAIFENESIN 600 MG: 600 TABLET, EXTENDED RELEASE ORAL at 08:51

## 2024-01-23 RX ADMIN — INSULIN LISPRO 8 UNITS: 100 INJECTION, SOLUTION INTRAVENOUS; SUBCUTANEOUS at 17:32

## 2024-01-23 RX ADMIN — METHYLPREDNISOLONE SODIUM SUCCINATE 40 MG: 40 INJECTION, POWDER, FOR SOLUTION INTRAMUSCULAR; INTRAVENOUS at 06:43

## 2024-01-23 RX ADMIN — INSULIN LISPRO 8 UNITS: 100 INJECTION, SOLUTION INTRAVENOUS; SUBCUTANEOUS at 01:59

## 2024-01-23 ASSESSMENT — COGNITIVE AND FUNCTIONAL STATUS - GENERAL
TURNING FROM BACK TO SIDE WHILE IN FLAT BAD: A LOT
WALKING IN HOSPITAL ROOM: A LOT
MOVING TO AND FROM BED TO CHAIR: A LOT
TOILETING: TOTAL
MOVING FROM LYING ON BACK TO SITTING ON SIDE OF FLAT BED WITH BEDRAILS: A LITTLE
DAILY ACTIVITIY SCORE: 14
STANDING UP FROM CHAIR USING ARMS: A LOT
CLIMB 3 TO 5 STEPS WITH RAILING: TOTAL
DRESSING REGULAR UPPER BODY CLOTHING: A LOT
DRESSING REGULAR LOWER BODY CLOTHING: TOTAL
MOBILITY SCORE: 12
HELP NEEDED FOR BATHING: A LOT

## 2024-01-23 ASSESSMENT — PAIN SCALES - GENERAL
PAINLEVEL_OUTOF10: 10 - WORST POSSIBLE PAIN
PAINLEVEL_OUTOF10: 5 - MODERATE PAIN
PAINLEVEL_OUTOF10: 10 - WORST POSSIBLE PAIN
PAINLEVEL_OUTOF10: 10 - WORST POSSIBLE PAIN

## 2024-01-23 ASSESSMENT — PAIN - FUNCTIONAL ASSESSMENT
PAIN_FUNCTIONAL_ASSESSMENT: 0-10

## 2024-01-23 ASSESSMENT — PAIN DESCRIPTION - ORIENTATION: ORIENTATION: RIGHT;LEFT

## 2024-01-23 ASSESSMENT — PAIN DESCRIPTION - LOCATION: LOCATION: HIP

## 2024-01-23 NOTE — CARE PLAN
The patient's goals for the shift include to have a WNL blood glucose.     The clinical goals for the shift include Maintain comfort    Over the shift, the patient did not make progress toward the following goals. Barriers to progression include Solumedrol administration. Recommendations to address these barriers include insulin coverage.

## 2024-01-23 NOTE — PROGRESS NOTES
SW asked [facility] and  precert team to expedite auth for return to LTC. Anuradha will continue to follow.  UPDATE 2:50p  SW was notified that auth was obtained for LTC at [facility]. SW to set up transport as MD states that pt is medically ready to dc. SW updated T[facility] and will continue to monitor for dc needs.   ADELA RON, NORMA

## 2024-01-23 NOTE — CARE PLAN
Problem: Pain  Goal: My pain/discomfort is manageable  1/23/2024 0251 by Praveena Larios RN  Outcome: Progressing  1/23/2024 0248 by Praveena Larios RN  Outcome: Progressing     Problem: Safety  Goal: Patient will be injury free during hospitalization  1/23/2024 0251 by Praveena Larios RN  Outcome: Progressing  1/23/2024 0248 by Praveena Larios RN  Outcome: Progressing  Goal: I will remain free of falls  1/23/2024 0251 by Praveena Larios RN  Outcome: Progressing  1/23/2024 0248 by Praveena Larios RN  Outcome: Progressing     Problem: Daily Care  Goal: Daily care needs are met  1/23/2024 0251 by Praveena Larios RN  Outcome: Progressing  1/23/2024 0248 by Praveena Larios RN  Outcome: Progressing     Problem: Psychosocial Needs  Goal: Demonstrates ability to cope with hospitalization/illness  1/23/2024 0251 by Praveena Larios RN  Outcome: Progressing  1/23/2024 0248 by Praveena Larios RN  Outcome: Progressing  Goal: Collaborate with me, my family, and caregiver to identify my specific goals  1/23/2024 0251 by Praveena Larios RN  Outcome: Progressing  1/23/2024 0248 by Praveena Larios RN  Outcome: Progressing     Problem: Discharge Barriers  Goal: My discharge needs are met  1/23/2024 0251 by Praveena Larios RN  Outcome: Progressing  1/23/2024 0248 by Praveena Larios RN  Outcome: Progressing     Problem: Pain - Adult  Goal: Verbalizes/displays adequate comfort level or baseline comfort level  1/23/2024 0251 by Praveena Larios RN  Outcome: Progressing  1/23/2024 0248 by Praveena Larios RN  Outcome: Progressing     Problem: Safety - Adult  Goal: Free from fall injury  1/23/2024 0251 by Praveena Larios RN  Outcome: Progressing  1/23/2024 0248 by Praveena Larios RN  Outcome: Progressing     Problem: Discharge Planning  Goal: Discharge to home or other facility with appropriate resources  1/23/2024 0251 by Praveena Larios RN  Outcome: Progressing  1/23/2024 0248 by Praveena Larios RN  Outcome: Progressing     Problem: Chronic Conditions and Co-morbidities  Goal: Patient's chronic  conditions and co-morbidity symptoms are monitored and maintained or improved  1/23/2024 0251 by Praveena Larios RN  Outcome: Progressing  1/23/2024 0248 by Praveena Larios RN  Outcome: Progressing     Problem: Skin  Goal: Decreased wound size/increased tissue granulation at next dressing change  1/23/2024 0251 by Praveena Larios, RN  Outcome: Progressing  1/23/2024 0248 by Praveena Larios, RN  Outcome: Progressing  Goal: Participates in plan/prevention/treatment measures  1/23/2024 0251 by Praveena Larios, RN  Outcome: Progressing  1/23/2024 0248 by Praveena Larios RN  Outcome: Progressing  Goal: Prevent/manage excess moisture  1/23/2024 0251 by Praveena Larios RN  Outcome: Progressing  1/23/2024 0248 by Praveena Larios RN  Outcome: Progressing  Goal: Prevent/minimize sheer/friction injuries  1/23/2024 0251 by Praveena Larios, RN  Outcome: Progressing  1/23/2024 0248 by Praveena Larios RN  Outcome: Progressing  Goal: Promote/optimize nutrition  1/23/2024 0251 by Praveena Larios, RN  Outcome: Progressing  1/23/2024 0248 by Praveena Larios RN  Outcome: Progressing  Goal: Promote skin healing  1/23/2024 0251 by Praveena Larios, RN  Outcome: Progressing  1/23/2024 0248 by Praveena Larios RN  Outcome: Progressing     Problem: Pain  Goal: Takes deep breaths with improved pain control throughout the shift  1/23/2024 0251 by Praveena Larios, RN  Outcome: Progressing  1/23/2024 0248 by Praveena Larios RN  Outcome: Progressing  Goal: Turns in bed with improved pain control throughout the shift  1/23/2024 0251 by Praveena Larios, RN  Outcome: Progressing  1/23/2024 0248 by Praveena Larios RN  Outcome: Progressing  Goal: Walks with improved pain control throughout the shift  1/23/2024 0251 by Praveena Larios, RN  Outcome: Progressing  1/23/2024 0248 by Praveena Larios RN  Outcome: Progressing  Goal: Performs ADL's with improved pain control throughout shift  1/23/2024 0251 by Praveena Larios, RN  Outcome: Progressing  1/23/2024 0248 by Praveena Bolte, RN  Outcome: Progressing  Goal: Participates in PT  with improved pain control throughout the shift  1/23/2024 0251 by Praveena Larios RN  Outcome: Progressing  1/23/2024 0248 by Praveena Larios RN  Outcome: Progressing  Goal: Free from opioid side effects throughout the shift  1/23/2024 0251 by Praveena Larios RN  Outcome: Progressing  1/23/2024 0248 by Praveena Larios RN  Outcome: Progressing  Goal: Free from acute confusion related to pain meds throughout the shift  1/23/2024 0251 by Praveena Larios RN  Outcome: Progressing  1/23/2024 0248 by Praveena Larios RN  Outcome: Progressing   The patient's goals for the shift include WNL glucose.    The clinical goals for the shift include Maintain comfort    Over the shift, the patient did not make progress toward the following goals. Barriers to progression include Solumedrol administration. Recommendations to address these barriers include Insulin s/s.

## 2024-01-23 NOTE — CARE PLAN
Problem: Pain  Goal: My pain/discomfort is manageable  1/23/2024 0345 by Praveena Larios, RN  Outcome: Progressing  1/23/2024 0252 by Praveena Larios, RN  Outcome: Progressing  1/23/2024 0251 by Praveena Larios, RN  Outcome: Progressing  1/23/2024 0248 by Praveena Larios, RN  Outcome: Progressing     Problem: Safety  Goal: Patient will be injury free during hospitalization  1/23/2024 0345 by Praveena Larios, RN  Outcome: Progressing  1/23/2024 0252 by Praveena Larios, RN  Outcome: Progressing  1/23/2024 0251 by Praveena Larios, RN  Outcome: Progressing  1/23/2024 0248 by Praveena Larios, RN  Outcome: Progressing  Goal: I will remain free of falls  1/23/2024 0345 by Praveena Larios, RN  Outcome: Progressing  1/23/2024 0252 by Praveena Larios, RN  Outcome: Progressing  1/23/2024 0251 by Praveena Larios, RN  Outcome: Progressing  1/23/2024 0248 by Praveena Larios, RN  Outcome: Progressing     Problem: Daily Care  Goal: Daily care needs are met  1/23/2024 0345 by Praveena Larios, RN  Outcome: Progressing  1/23/2024 0252 by Praveena Larios, RN  Outcome: Progressing  1/23/2024 0251 by Praveena Larios, RN  Outcome: Progressing  1/23/2024 0248 by Praveena Larios, RN  Outcome: Progressing     Problem: Psychosocial Needs  Goal: Demonstrates ability to cope with hospitalization/illness  1/23/2024 0252 by Praveena Larios, RN  Outcome: Progressing  1/23/2024 0251 by Praveena Larios, RN  Outcome: Progressing  1/23/2024 0248 by Praveena Larios, RN  Outcome: Progressing  Goal: Collaborate with me, my family, and caregiver to identify my specific goals  1/23/2024 0252 by Praveena Larios, RN  Outcome: Progressing  1/23/2024 0251 by Praveena Larios, RN  Outcome: Progressing  1/23/2024 0248 by Praveena Larios, RN  Outcome: Progressing     Problem: Discharge Barriers  Goal: My discharge needs are met  1/23/2024 0252 by Praveena Larios, RN  Outcome: Progressing  1/23/2024 0251 by Praveena Larios RN  Outcome: Progressing  1/23/2024 0248 by Praveena Larios, VELMA  Outcome: Progressing     Problem: Pain - Adult  Goal: Verbalizes/displays  adequate comfort level or baseline comfort level  1/23/2024 0345 by Praveena Larios, RN  Outcome: Progressing  1/23/2024 0252 by Praveena Larios, RN  Outcome: Progressing  1/23/2024 0251 by Praveena Larios, RN  Outcome: Progressing  1/23/2024 0248 by Praveena Larios, RN  Outcome: Progressing     Problem: Safety - Adult  Goal: Free from fall injury  1/23/2024 0345 by Praveena Larios, RN  Outcome: Progressing  1/23/2024 0252 by Praveena Larios, RN  Outcome: Progressing  1/23/2024 0251 by Praveena Larios, RN  Outcome: Progressing  1/23/2024 0248 by Praveena Larios, RN  Outcome: Progressing     Problem: Discharge Planning  Goal: Discharge to home or other facility with appropriate resources  1/23/2024 0252 by Praveena Larios, RN  Outcome: Progressing  1/23/2024 0251 by Praveena Larios, RN  Outcome: Progressing  1/23/2024 0248 by Praveena Larios RN  Outcome: Progressing     Problem: Chronic Conditions and Co-morbidities  Goal: Patient's chronic conditions and co-morbidity symptoms are monitored and maintained or improved  1/23/2024 0252 by Praveena Larios, RN  Outcome: Progressing  1/23/2024 0251 by Praveena Larios, RN  Outcome: Progressing  1/23/2024 0248 by Praveena Larios, RN  Outcome: Progressing     Problem: Skin  Goal: Decreased wound size/increased tissue granulation at next dressing change  1/23/2024 0254 by Praveena Larios, RN  Outcome: Progressing  1/23/2024 0254 by Praveena Larios, RN  Outcome: Progressing  1/23/2024 0252 by Praveena Larios, RN  Outcome: Progressing  1/23/2024 0251 by Praveena Larios, RN  Outcome: Progressing  1/23/2024 0248 by Praveena Larios RN  Outcome: Progressing  Goal: Participates in plan/prevention/treatment measures  1/23/2024 0254 by Praveena Larios, RN  Outcome: Progressing  1/23/2024 0254 by Praveena Larios, RN  Outcome: Progressing  1/23/2024 0252 by Praveena Larios, RN  Outcome: Progressing  1/23/2024 0251 by Praveena Larios, RN  Outcome: Progressing  1/23/2024 0248 by Praveena Larios RN  Outcome: Progressing  Goal: Prevent/manage excess moisture  1/23/2024 0254 by Praveena  Harriet, RN  Outcome: Progressing  1/23/2024 0254 by Praveena Larios, RN  Outcome: Progressing  1/23/2024 0252 by Praveena Larios, RN  Outcome: Progressing  1/23/2024 0251 by Praveena Larios, RN  Outcome: Progressing  1/23/2024 0248 by Praveena Larios, RN  Outcome: Progressing  Goal: Prevent/minimize sheer/friction injuries  1/23/2024 0345 by Praveena Larios, RN  Outcome: Progressing  1/23/2024 0254 by Praveena Larios, RN  Outcome: Progressing  1/23/2024 0254 by Praveena Larios, RN  Outcome: Progressing  1/23/2024 0252 by Praveena Larios, RN  Outcome: Progressing  1/23/2024 0251 by Praveena Larios, RN  Outcome: Progressing  1/23/2024 0248 by Praveena Larios, RN  Outcome: Progressing  Goal: Promote/optimize nutrition  1/23/2024 0254 by Praveena Larios, RN  Outcome: Progressing  1/23/2024 0254 by Praveena Larios, RN  Outcome: Progressing  1/23/2024 0252 by Praveena Larios, RN  Outcome: Progressing  1/23/2024 0251 by Praveena Larios, RN  Outcome: Progressing  1/23/2024 0248 by Praveena Larios, RN  Outcome: Progressing  Goal: Promote skin healing  1/23/2024 0254 by Praveena Larios, RN  Outcome: Progressing  1/23/2024 0254 by Praveena Larios, RN  Outcome: Progressing  1/23/2024 0252 by Praveena Larios, RN  Outcome: Progressing  1/23/2024 0251 by Praveena Larios, RN  Outcome: Progressing  1/23/2024 0248 by Praveena Larios, RN  Outcome: Progressing     Problem: Pain  Goal: Takes deep breaths with improved pain control throughout the shift  1/23/2024 0252 by Praveena Larios, RN  Outcome: Progressing  1/23/2024 0251 by Praveena Larios, RN  Outcome: Progressing  1/23/2024 0248 by Praveena Larios, RN  Outcome: Progressing  Goal: Turns in bed with improved pain control throughout the shift  1/23/2024 0252 by Praveena Larios, RN  Outcome: Progressing  1/23/2024 0251 by Praveena Larios, RN  Outcome: Progressing  1/23/2024 0248 by Praveena Larios RN  Outcome: Progressing  Goal: Walks with improved pain control throughout the shift  1/23/2024 0252 by Praveena Larios RN  Outcome: Progressing  1/23/2024 0251 by Praveena Larios RN  Outcome:  Progressing  1/23/2024 0248 by Praveena Larios RN  Outcome: Progressing  Goal: Performs ADL's with improved pain control throughout shift  1/23/2024 0252 by Praveena Larios, RN  Outcome: Progressing  1/23/2024 0251 by Praveena Larios RN  Outcome: Progressing  1/23/2024 0248 by Praveena Larios RN  Outcome: Progressing  Goal: Participates in PT with improved pain control throughout the shift  1/23/2024 0252 by Praveena Larios, RN  Outcome: Progressing  1/23/2024 0251 by Praveena Larios, RN  Outcome: Progressing  1/23/2024 0248 by Praveena Larios RN  Outcome: Progressing  Goal: Free from opioid side effects throughout the shift  1/23/2024 0252 by Praveena Larios, VELMA  Outcome: Progressing  1/23/2024 0251 by Praveena Larios RN  Outcome: Progressing  1/23/2024 0248 by Praveena Larios RN  Outcome: Progressing  Goal: Free from acute confusion related to pain meds throughout the shift  1/23/2024 0252 by Praveena Larios RN  Outcome: Progressing  1/23/2024 0251 by Praveena Larios RN  Outcome: Progressing  1/23/2024 0248 by Praveena Larios RN  Outcome: Progressing   The patient's goals for the shift include      The clinical goals for the shift include maintain comfort

## 2024-03-22 ENCOUNTER — HOSPITAL ENCOUNTER (EMERGENCY)
Facility: HOSPITAL | Age: 64
Discharge: NURSING, REHAB OR LONG-TERM CARE | End: 2024-03-23
Attending: INTERNAL MEDICINE
Payer: MEDICAID

## 2024-03-22 ENCOUNTER — APPOINTMENT (OUTPATIENT)
Dept: RADIOLOGY | Facility: HOSPITAL | Age: 64
End: 2024-03-22
Payer: MEDICAID

## 2024-03-22 VITALS
BODY MASS INDEX: 48.82 KG/M2 | SYSTOLIC BLOOD PRESSURE: 119 MMHG | TEMPERATURE: 97.7 F | RESPIRATION RATE: 18 BRPM | OXYGEN SATURATION: 98 % | HEIGHT: 65 IN | DIASTOLIC BLOOD PRESSURE: 66 MMHG | WEIGHT: 293 LBS | HEART RATE: 80 BPM
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
## 2024-03-22 DIAGNOSIS — M16.11 OSTEOARTHRITIS OF RIGHT HIP, UNSPECIFIED OSTEOARTHRITIS TYPE: ICD-10-CM

## 2024-03-22 DIAGNOSIS — S79.911A INJURY OF RIGHT HIP, INITIAL ENCOUNTER: ICD-10-CM

## 2024-03-22 PROCEDURE — 73552 X-RAY EXAM OF FEMUR 2/>: CPT | Mod: RIGHT SIDE | Performed by: RADIOLOGY

## 2024-03-22 PROCEDURE — 72170 X-RAY EXAM OF PELVIS: CPT | Mod: FOREIGN READ | Performed by: RADIOLOGY

## 2024-03-22 PROCEDURE — 99284 EMERGENCY DEPT VISIT MOD MDM: CPT

## 2024-03-22 PROCEDURE — 72170 X-RAY EXAM OF PELVIS: CPT

## 2024-03-22 PROCEDURE — 2500000001 HC RX 250 WO HCPCS SELF ADMINISTERED DRUGS (ALT 637 FOR MEDICARE OP): Performed by: INTERNAL MEDICINE

## 2024-03-22 PROCEDURE — 73552 X-RAY EXAM OF FEMUR 2/>: CPT | Mod: RT

## 2024-03-22 RX ORDER — HYDROCODONE BITARTRATE AND ACETAMINOPHEN 5; 325 MG/1; MG/1
2 TABLET ORAL ONCE
Status: COMPLETED | OUTPATIENT
Start: 2024-03-22 | End: 2024-03-22

## 2024-03-22 RX ADMIN — HYDROCODONE BITARTRATE AND ACETAMINOPHEN 2 TABLET: 5; 325 TABLET ORAL at 22:36

## 2024-03-22 ASSESSMENT — PAIN SCALES - GENERAL
PAINLEVEL_OUTOF10: 7
PAINLEVEL_OUTOF10: 7

## 2024-03-22 ASSESSMENT — PAIN DESCRIPTION - LOCATION
LOCATION: BACK
LOCATION: GENERALIZED

## 2024-03-22 ASSESSMENT — COLUMBIA-SUICIDE SEVERITY RATING SCALE - C-SSRS
1. IN THE PAST MONTH, HAVE YOU WISHED YOU WERE DEAD OR WISHED YOU COULD GO TO SLEEP AND NOT WAKE UP?: NO
2. HAVE YOU ACTUALLY HAD ANY THOUGHTS OF KILLING YOURSELF?: NO
6. HAVE YOU EVER DONE ANYTHING, STARTED TO DO ANYTHING, OR PREPARED TO DO ANYTHING TO END YOUR LIFE?: NO

## 2024-03-22 ASSESSMENT — PAIN - FUNCTIONAL ASSESSMENT
PAIN_FUNCTIONAL_ASSESSMENT: 0-10
PAIN_FUNCTIONAL_ASSESSMENT: 0-10

## 2024-03-22 ASSESSMENT — PAIN DESCRIPTION - PAIN TYPE: TYPE: ACUTE PAIN

## 2024-03-23 RX ORDER — MELOXICAM 7.5 MG/1
7.5 TABLET ORAL DAILY
Qty: 10 TABLET | Refills: 0 | Status: SHIPPED | OUTPATIENT
Start: 2024-03-23 | End: 2024-03-23 | Stop reason: SDUPTHER

## 2024-03-23 RX ORDER — MELOXICAM 7.5 MG/1
7.5 TABLET ORAL DAILY
Qty: 10 TABLET | Refills: 0 | Status: SHIPPED | OUTPATIENT
Start: 2024-03-23 | End: 2024-04-02

## 2024-03-23 NOTE — ED PROVIDER NOTES
HPI   Chief Complaint   Patient presents with    Fall     Pt arrived to ed via ems from nursing facility. Pt states she fell on her left side trying to get from bedside comode to bed. Pt states she Is having left side pain. Pt is alert and oriented x 4. Pt denies chest pain and sob resp rather than her baseline. Pt is on oxygen.        Patient presenting for evaluation of a fall onto her right hip.  Patient states she was attempting to stand at the end of her bed when she tripped and fell onto her right hip.  Patient notes she has difficulty moving her legs particular left leg.  Patient states she has chronic difficulty moving her legs.  Patient also notes she has chronic right hip pain however the pain she is currently experiencing is different.  Patient fell onto the outside of right hip.  Patient denies head or neck injury.      History provided by:  Patient                      Marino Coma Scale Score: 15                     Patient History   No past medical history on file.  Past Surgical History:   Procedure Laterality Date    CARDIAC CATHETERIZATION N/A 1/17/2024    Procedure: Left Heart Cath, With LV;  Surgeon: Chaitanya Adrian MD;  Location: [facility] Cardiac Cath Lab;  Service: Cardiovascular;  Laterality: N/A;     No family history on file.  Social History     Tobacco Use    Smoking status: Former     Types: Cigarettes    Smokeless tobacco: Never   Substance Use Topics    Alcohol use: Not on file    Drug use: Not on file       Physical Exam   ED Triage Vitals   Temp Pulse Resp BP   -- -- -- --      SpO2 Temp src Heart Rate Source Patient Position   -- -- -- --      BP Location FiO2 (%)     -- --       Physical Exam  Vitals and nursing note reviewed.   Constitutional:       Appearance: She is obese.      Interventions: Nasal cannula in place.   HENT:      Head: Atraumatic.      Right Ear: External ear normal.      Left Ear: External ear normal.      Nose: Nose normal.      Mouth/Throat:      Mouth: Mucous  membranes are moist.   Eyes:      Extraocular Movements: Extraocular movements intact.      Pupils: Pupils are equal, round, and reactive to light.   Cardiovascular:      Rate and Rhythm: Normal rate and regular rhythm.      Pulses: Normal pulses.   Pulmonary:      Effort: Pulmonary effort is normal.      Breath sounds: Normal breath sounds.   Abdominal:      Palpations: Abdomen is soft.      Tenderness: There is no abdominal tenderness.   Musculoskeletal:         General: Normal range of motion.      Cervical back: Normal range of motion and neck supple. No rigidity or tenderness. No spinous process tenderness.      Right hip: Bony tenderness present.      Left hip: No bony tenderness.      Right upper leg: Tenderness present.      Left upper leg: No tenderness.      Right knee: No tenderness.      Left knee: No tenderness.   Skin:     General: Skin is warm and dry.   Neurological:      General: No focal deficit present.      Mental Status: She is alert and oriented to person, place, and time. Mental status is at baseline.   Psychiatric:         Mood and Affect: Mood normal.         Behavior: Behavior normal.         ED Course & [facility]   ED Course as of 03/23/24 0032   Fri Mar 22, 2024   2352 Reevaluated the patient.  Patient is sleeping on my arrival to bedside.  Patient wakes up and notes that she has ongoing pain in the right hip.  Discussed x-ray findings with concern for osteoarthritis.  I asked the patient how much she is able to walk and she states she really can only pivot to her commode and back. [JA]      ED Course User Index  [JA] Wesly Romero DO         Diagnoses as of 03/23/24 0032   Fall, initial encounter   Injury of right hip, initial encounter   Osteoarthritis of right hip, unspecified osteoarthritis type       Medical Decision Making  Differential diagnosis: Right hip contusion, right hip fracture, femur fracture, pelvic fracture, other    Patient presenting after a fall from standing position.   Patient notes she does not walk but can sometimes pivot from bed to bedside commode.  X-rays negative.  Patient does have increased body habitus and I do not feel patient would be amenable to our CT scanner at for further evaluation.  As the patient does not walk this would not  this time.  Pain improved in the ED.  Patient resting comfortably.  Patient requesting to go back to her facility.  Patient is neuro vas intact distally.  Will add Mobic after review of previous renal function.  Patient agrees to return to ED if having worsening symptoms or other concerns.        Procedure  Procedures     Wesly Romero DO  03/23/24 0032

## (undated) DEVICE — CATHETER, OPTITORQUE, 5FR, TIG, 1H/100CM

## (undated) DEVICE — INTRODUCER, GLIDESHEATH SLENDER A-KIT, 6FR 10CM

## (undated) DEVICE — GUIDEWIRE, INQWIRE, 3MM J, .035 X 210CM, FIXED

## (undated) DEVICE — TR BAND, RADIAL COMPRESSION, LONG, 29CM

## (undated) DEVICE — SHEATH, GLIDESHEATH, SLENDER, 6FR 10CM